# Patient Record
Sex: FEMALE | Race: WHITE | NOT HISPANIC OR LATINO | Employment: UNEMPLOYED | URBAN - METROPOLITAN AREA
[De-identification: names, ages, dates, MRNs, and addresses within clinical notes are randomized per-mention and may not be internally consistent; named-entity substitution may affect disease eponyms.]

---

## 2018-07-31 NOTE — PLAN OF CARE
Please call extension [phone] (if nobody answers after pressing #1 for admission, this will flip to a beeper after pressing #1 for admission, so put in your call back number) upon patient arrival to floor for Hospital Medicine admit team assignment and for additional admit orders for the patient.       Outside Transfer Acceptance Note     Patients name: WARREN MCINTYRE MRN: [id number]    Transferring Physician or Mid-Level provider/Clinic giving report:   DR. LEBLANC AT [facility] IN ICU (STEPPING DOWN)    Accepting Physician for admission to hospital: Gabriele Duckworth M.D.      Date of acceptance:  7/31/18, LEVEL II, TELE, NEURO STEPDOWN     Reason for transfer:  Catatonia, needs VEEG    Report from Physician/Mid-Level Provider:    HPI:  Ms. Mcintyre is a 29yo lady with a past medical history of depression. She experienced an episode of catatonia in 2015 after the death of her mother, which led to a stint in an inpatient psychiatric facility.       She was just admitted to the ICU at [facility] on the night of 7/29/18.  She remains in the ICU currently, but is being stepped down.  She was found altered and brought to the ED (nobody can provide collateral details of this, and she has had no family visit or return calls).  Since admit she will only blink and stare ahead.  At admission she was dehydrated with an elevated lactic acid and Na level.  This morning she became slightly more lucid and was alert x3, only to return to her previous catatonic state.     At admission her WBC was normal, then merry to 18k, then returned to normal.  She had blood cxs done, which turned positive 1 of 2 with GPC (too early to know if chains or clusters), for which she was started on Vancomyin and Rocephin.  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  The sending MD was unable to perform a LP due to her being given Lovenox for DVT prophylaxis.      They feel that she needs  24 hour VEEG, Psychiatry and Neurology evaluation, none of which is available there.  I discussed the case with [facility] staff, Dr. Smith, who feels the patient is stable for the floor rather than NCC for the time being.  VS:  99.8F, P85, RR 14, /77  LABS:  admit: LA 3.8, Na 150, HCO3 12  Currently: LA and BMP normal except mild hypokalemia  UDS - NEGATIVE, UPT - NEGATIVE, TSH -PENDING  U/A - NEGATIVE    RADIOGRAPHS:   CT HEAD: NML  MRI BRAIN: NML  CXR: NML        To Do List upon arrival:  PLACE ON NEURO STEPDOWN FOR 24 HOUR VEEG, NEUROLOGY AND PSYCHIATRY CONSULT, RPR, HIV, TSH, NH3, NEURO CHECKS Q4 HOURS.    Please call extension [phone] (if nobody answers after pressing #1 for admission, this will flip to a beeper after pressing #1 for admission, so put in your call back number) upon patient arrival to floor for Hospital Medicine admit team assignment and for additional admit orders for the patient.

## 2018-08-01 ENCOUNTER — HOSPITAL ENCOUNTER (INPATIENT)
Facility: HOSPITAL | Age: 28
LOS: 7 days | Discharge: ANOTHER HOSPITAL | DRG: 885 | End: 2018-08-08
Attending: INTERNAL MEDICINE | Admitting: HOSPITALIST
Payer: MEDICAID

## 2018-08-01 DIAGNOSIS — F06.1 CATATONIA: Primary | ICD-10-CM

## 2018-08-01 DIAGNOSIS — R41.82 ALTERED MENTAL STATE: ICD-10-CM

## 2018-08-01 DIAGNOSIS — R41.82 ALTERED MENTAL STATUS, UNSPECIFIED ALTERED MENTAL STATUS TYPE: ICD-10-CM

## 2018-08-01 DIAGNOSIS — F32.2 MAJOR DEPRESSIVE DISORDER, SEVERE: ICD-10-CM

## 2018-08-01 PROBLEM — D68.9 COAGULOPATHY: Status: ACTIVE | Noted: 2018-08-01

## 2018-08-01 PROBLEM — R78.81 BACTEREMIA: Status: ACTIVE | Noted: 2018-08-01

## 2018-08-01 LAB
ALBUMIN SERPL BCP-MCNC: 3.2 G/DL
ALP SERPL-CCNC: 52 U/L
ALT SERPL W/O P-5'-P-CCNC: 10 U/L
AMMONIA PLAS-SCNC: 48 UMOL/L
ANION GAP SERPL CALC-SCNC: 9 MMOL/L
AST SERPL-CCNC: 24 U/L
BASOPHILS # BLD AUTO: 0.03 K/UL
BASOPHILS NFR BLD: 0.2 %
BILIRUB SERPL-MCNC: 0.4 MG/DL
BUN SERPL-MCNC: 5 MG/DL
CALCIUM SERPL-MCNC: 8.9 MG/DL
CHLORIDE SERPL-SCNC: 106 MMOL/L
CK SERPL-CCNC: 183 U/L
CO2 SERPL-SCNC: 25 MMOL/L
CREAT SERPL-MCNC: 0.6 MG/DL
CRP SERPL-MCNC: 3.8 MG/L
DIFFERENTIAL METHOD: ABNORMAL
EOSINOPHIL # BLD AUTO: 0 K/UL
EOSINOPHIL NFR BLD: 0.2 %
ERYTHROCYTE [DISTWIDTH] IN BLOOD BY AUTOMATED COUNT: 13.9 %
ERYTHROCYTE [SEDIMENTATION RATE] IN BLOOD BY WESTERGREN METHOD: 10 MM/HR
EST. GFR  (AFRICAN AMERICAN): >60 ML/MIN/1.73 M^2
EST. GFR  (NON AFRICAN AMERICAN): >60 ML/MIN/1.73 M^2
GLUCOSE SERPL-MCNC: 110 MG/DL
HCT VFR BLD AUTO: 39.5 %
HGB BLD-MCNC: 13.2 G/DL
HIV1+2 IGG SERPL QL IA.RAPID: NEGATIVE
IMM GRANULOCYTES # BLD AUTO: 0.27 K/UL
IMM GRANULOCYTES NFR BLD AUTO: 2.2 %
INR PPP: 1.4
LACTATE SERPL-SCNC: 1.2 MMOL/L
LYMPHOCYTES # BLD AUTO: 3.1 K/UL
LYMPHOCYTES NFR BLD: 25.2 %
MAGNESIUM SERPL-MCNC: 2.3 MG/DL
MCH RBC QN AUTO: 31.7 PG
MCHC RBC AUTO-ENTMCNC: 33.4 G/DL
MCV RBC AUTO: 95 FL
MONOCYTES # BLD AUTO: 1.1 K/UL
MONOCYTES NFR BLD: 8.8 %
NEUTROPHILS # BLD AUTO: 7.7 K/UL
NEUTROPHILS NFR BLD: 63.4 %
NRBC BLD-RTO: 0 /100 WBC
PHOSPHATE SERPL-MCNC: 3.3 MG/DL
PLATELET # BLD AUTO: 200 K/UL
PMV BLD AUTO: 11.8 FL
POCT GLUCOSE: 103 MG/DL (ref 70–110)
POTASSIUM SERPL-SCNC: 4.5 MMOL/L
PROT SERPL-MCNC: 6.5 G/DL
PROTHROMBIN TIME: 14 SEC
RBC # BLD AUTO: 4.17 M/UL
SODIUM SERPL-SCNC: 140 MMOL/L
TSH SERPL DL<=0.005 MIU/L-ACNC: 2.56 UIU/ML
TSH SERPL DL<=0.005 MIU/L-ACNC: 2.56 UIU/ML
WBC # BLD AUTO: 12.11 K/UL

## 2018-08-01 PROCEDURE — 93005 ELECTROCARDIOGRAM TRACING: CPT

## 2018-08-01 PROCEDURE — 82140 ASSAY OF AMMONIA: CPT

## 2018-08-01 PROCEDURE — 86140 C-REACTIVE PROTEIN: CPT

## 2018-08-01 PROCEDURE — 85025 COMPLETE CBC W/AUTO DIFF WBC: CPT

## 2018-08-01 PROCEDURE — 99223 1ST HOSP IP/OBS HIGH 75: CPT | Mod: ,,, | Performed by: HOSPITALIST

## 2018-08-01 PROCEDURE — 82550 ASSAY OF CK (CPK): CPT

## 2018-08-01 PROCEDURE — 86703 HIV-1/HIV-2 1 RESULT ANTBDY: CPT

## 2018-08-01 PROCEDURE — 86592 SYPHILIS TEST NON-TREP QUAL: CPT

## 2018-08-01 PROCEDURE — 84100 ASSAY OF PHOSPHORUS: CPT

## 2018-08-01 PROCEDURE — 85651 RBC SED RATE NONAUTOMATED: CPT

## 2018-08-01 PROCEDURE — 87040 BLOOD CULTURE FOR BACTERIA: CPT | Mod: 59

## 2018-08-01 PROCEDURE — 83735 ASSAY OF MAGNESIUM: CPT

## 2018-08-01 PROCEDURE — 84443 ASSAY THYROID STIM HORMONE: CPT

## 2018-08-01 PROCEDURE — 80053 COMPREHEN METABOLIC PANEL: CPT

## 2018-08-01 PROCEDURE — 93010 ELECTROCARDIOGRAM REPORT: CPT | Mod: ,,, | Performed by: INTERNAL MEDICINE

## 2018-08-01 PROCEDURE — 83605 ASSAY OF LACTIC ACID: CPT

## 2018-08-01 PROCEDURE — 20600001 HC STEP DOWN PRIVATE ROOM

## 2018-08-01 PROCEDURE — 85610 PROTHROMBIN TIME: CPT

## 2018-08-01 PROCEDURE — 82607 VITAMIN B-12: CPT

## 2018-08-01 PROCEDURE — 25000003 PHARM REV CODE 250: Performed by: HOSPITALIST

## 2018-08-01 PROCEDURE — 63600175 PHARM REV CODE 636 W HCPCS: Performed by: HOSPITALIST

## 2018-08-01 RX ORDER — ENOXAPARIN SODIUM 100 MG/ML
40 INJECTION SUBCUTANEOUS EVERY 24 HOURS
Status: DISCONTINUED | OUTPATIENT
Start: 2018-08-01 | End: 2018-08-08 | Stop reason: HOSPADM

## 2018-08-01 RX ORDER — PHYTONADIONE 10 MG/ML
10 INJECTION, EMULSION INTRAMUSCULAR; INTRAVENOUS; SUBCUTANEOUS ONCE
Status: COMPLETED | OUTPATIENT
Start: 2018-08-02 | End: 2018-08-02

## 2018-08-01 RX ORDER — LORAZEPAM 2 MG/ML
1 INJECTION INTRAMUSCULAR 3 TIMES DAILY
Status: DISCONTINUED | OUTPATIENT
Start: 2018-08-01 | End: 2018-08-02

## 2018-08-01 RX ORDER — SODIUM CHLORIDE 0.9 % (FLUSH) 0.9 %
5 SYRINGE (ML) INJECTION
Status: DISCONTINUED | OUTPATIENT
Start: 2018-08-01 | End: 2018-08-08 | Stop reason: HOSPADM

## 2018-08-01 RX ORDER — LINEZOLID 2 MG/ML
600 INJECTION, SOLUTION INTRAVENOUS
Status: DISCONTINUED | OUTPATIENT
Start: 2018-08-01 | End: 2018-08-05

## 2018-08-01 RX ORDER — ALPRAZOLAM 1 MG/1
1 TABLET ORAL 3 TIMES DAILY
Status: ON HOLD | COMMUNITY
End: 2018-08-08 | Stop reason: HOSPADM

## 2018-08-01 RX ORDER — ZOLPIDEM TARTRATE 10 MG/1
5 TABLET ORAL NIGHTLY
Status: ON HOLD | COMMUNITY
End: 2018-08-08 | Stop reason: HOSPADM

## 2018-08-01 RX ORDER — DEXTROSE MONOHYDRATE, SODIUM CHLORIDE, AND POTASSIUM CHLORIDE 50; 1.49; 4.5 G/1000ML; G/1000ML; G/1000ML
INJECTION, SOLUTION INTRAVENOUS CONTINUOUS
Status: DISCONTINUED | OUTPATIENT
Start: 2018-08-01 | End: 2018-08-08 | Stop reason: HOSPADM

## 2018-08-01 RX ORDER — LIDOCAINE 50 MG/G
1 PATCH TOPICAL
Status: DISCONTINUED | OUTPATIENT
Start: 2018-08-01 | End: 2018-08-08 | Stop reason: HOSPADM

## 2018-08-01 RX ADMIN — LINEZOLID 600 MG: 600 INJECTION, SOLUTION INTRAVENOUS at 10:08

## 2018-08-01 RX ADMIN — LORAZEPAM 1 MG: 2 INJECTION INTRAMUSCULAR; INTRAVENOUS at 10:08

## 2018-08-01 RX ADMIN — DEXTROSE MONOHYDRATE, SODIUM CHLORIDE, AND POTASSIUM CHLORIDE: 50; 4.5; 1.49 INJECTION, SOLUTION INTRAVENOUS at 10:08

## 2018-08-02 PROBLEM — F32.2 MAJOR DEPRESSIVE DISORDER, SEVERE: Status: ACTIVE | Noted: 2018-08-02

## 2018-08-02 LAB
ANION GAP SERPL CALC-SCNC: 9 MMOL/L
BASOPHILS # BLD AUTO: 0.03 K/UL
BASOPHILS NFR BLD: 0.3 %
BUN SERPL-MCNC: 6 MG/DL
CALCIUM SERPL-MCNC: 8.8 MG/DL
CHLORIDE SERPL-SCNC: 106 MMOL/L
CO2 SERPL-SCNC: 25 MMOL/L
CREAT SERPL-MCNC: 0.6 MG/DL
DIFFERENTIAL METHOD: ABNORMAL
EOSINOPHIL # BLD AUTO: 0.1 K/UL
EOSINOPHIL NFR BLD: 0.6 %
ERYTHROCYTE [DISTWIDTH] IN BLOOD BY AUTOMATED COUNT: 13.8 %
EST. GFR  (AFRICAN AMERICAN): >60 ML/MIN/1.73 M^2
EST. GFR  (NON AFRICAN AMERICAN): >60 ML/MIN/1.73 M^2
GLUCOSE SERPL-MCNC: 119 MG/DL
HCT VFR BLD AUTO: 37.7 %
HGB BLD-MCNC: 12.7 G/DL
IMM GRANULOCYTES # BLD AUTO: 0.07 K/UL
IMM GRANULOCYTES NFR BLD AUTO: 0.7 %
LYMPHOCYTES # BLD AUTO: 2 K/UL
LYMPHOCYTES NFR BLD: 18.4 %
MCH RBC QN AUTO: 31.7 PG
MCHC RBC AUTO-ENTMCNC: 33.7 G/DL
MCV RBC AUTO: 94 FL
MONOCYTES # BLD AUTO: 0.9 K/UL
MONOCYTES NFR BLD: 8.8 %
NEUTROPHILS # BLD AUTO: 7.6 K/UL
NEUTROPHILS NFR BLD: 71.2 %
NRBC BLD-RTO: 0 /100 WBC
PLATELET # BLD AUTO: 188 K/UL
PMV BLD AUTO: 12 FL
POCT GLUCOSE: 123 MG/DL (ref 70–110)
POTASSIUM SERPL-SCNC: 3.5 MMOL/L
RBC # BLD AUTO: 4.01 M/UL
RPR SER QL: NORMAL
SODIUM SERPL-SCNC: 140 MMOL/L
VIT B12 SERPL-MCNC: 1047 PG/ML
WBC # BLD AUTO: 10.6 K/UL

## 2018-08-02 PROCEDURE — 25000003 PHARM REV CODE 250: Performed by: HOSPITALIST

## 2018-08-02 PROCEDURE — 99233 SBSQ HOSP IP/OBS HIGH 50: CPT | Mod: ,,, | Performed by: INTERNAL MEDICINE

## 2018-08-02 PROCEDURE — 99232 SBSQ HOSP IP/OBS MODERATE 35: CPT | Mod: ,,, | Performed by: PSYCHIATRY & NEUROLOGY

## 2018-08-02 PROCEDURE — 63600175 PHARM REV CODE 636 W HCPCS: Performed by: PSYCHIATRY & NEUROLOGY

## 2018-08-02 PROCEDURE — 36415 COLL VENOUS BLD VENIPUNCTURE: CPT

## 2018-08-02 PROCEDURE — 63600175 PHARM REV CODE 636 W HCPCS: Performed by: HOSPITALIST

## 2018-08-02 PROCEDURE — 80048 BASIC METABOLIC PNL TOTAL CA: CPT

## 2018-08-02 PROCEDURE — 85025 COMPLETE CBC W/AUTO DIFF WBC: CPT

## 2018-08-02 PROCEDURE — 90792 PSYCH DIAG EVAL W/MED SRVCS: CPT | Mod: ,,, | Performed by: PSYCHIATRY & NEUROLOGY

## 2018-08-02 PROCEDURE — 20600001 HC STEP DOWN PRIVATE ROOM

## 2018-08-02 RX ORDER — LORAZEPAM 2 MG/ML
1 INJECTION INTRAMUSCULAR 4 TIMES DAILY
Status: DISCONTINUED | OUTPATIENT
Start: 2018-08-02 | End: 2018-08-08 | Stop reason: HOSPADM

## 2018-08-02 RX ADMIN — LORAZEPAM 1 MG: 2 INJECTION INTRAMUSCULAR; INTRAVENOUS at 03:08

## 2018-08-02 RX ADMIN — LORAZEPAM 1 MG: 2 INJECTION INTRAMUSCULAR; INTRAVENOUS at 08:08

## 2018-08-02 RX ADMIN — ENOXAPARIN SODIUM 40 MG: 100 INJECTION SUBCUTANEOUS at 05:08

## 2018-08-02 RX ADMIN — DEXTROSE MONOHYDRATE, SODIUM CHLORIDE, AND POTASSIUM CHLORIDE: 50; 4.5; 1.49 INJECTION, SOLUTION INTRAVENOUS at 12:08

## 2018-08-02 RX ADMIN — LORAZEPAM 1 MG: 2 INJECTION INTRAMUSCULAR; INTRAVENOUS at 09:08

## 2018-08-02 RX ADMIN — LIDOCAINE 1 PATCH: 50 PATCH TOPICAL at 12:08

## 2018-08-02 RX ADMIN — DEXTROSE MONOHYDRATE, SODIUM CHLORIDE, AND POTASSIUM CHLORIDE: 50; 4.5; 1.49 INJECTION, SOLUTION INTRAVENOUS at 06:08

## 2018-08-02 RX ADMIN — PHYTONADIONE 10 MG: 10 INJECTION, EMULSION INTRAMUSCULAR; INTRAVENOUS; SUBCUTANEOUS at 12:08

## 2018-08-02 RX ADMIN — ENOXAPARIN SODIUM 40 MG: 100 INJECTION SUBCUTANEOUS at 12:08

## 2018-08-02 RX ADMIN — LINEZOLID 600 MG: 600 INJECTION, SOLUTION INTRAVENOUS at 01:08

## 2018-08-02 NOTE — PROGRESS NOTES
Hospital Medicine  Progress note    I personally performed the history, physical exam and medical decision making: and confirmed the accuracy of the information in the transcribed note.    Team: [facility] HOSP MED B Mohsen Astorga MD  Admit Date: 8/1/2018  KRISHNA 8/6/2018  Code status: Full Code    Principal Problem:  Catatonia    Interval hx: Psych recommends increase ativan IV to 2mg PO q6hrs, if no improvement would obtain consent from father for ECT. Would consider addition of antidepressant after Zyvox treatment has been completed and outpatient coordination with Dr Cormier.      ROS   Unable to perform ROS: Mental status change       PEx  Temp:  [97.3 °F (36.3 °C)-99.8 °F (37.7 °C)]   Pulse:  [82-97]   Resp:  [16-20]   BP: (113-131)/(61-81)   SpO2:  [96 %-98 %]   No intake or output data in the 24 hours ending 08/02/18 1342     Constitutional: She appears well-developed and well-nourished. No distress. obese   Cardiovascular: Normal rate, regular rhythm, normal heart sounds and intact distal pulses.  Exam reveals no gallop and no friction rub.    No murmur heard.  Pulmonary/Chest: Effort normal and breath sounds normal. No respiratory distress. She has no wheezes. She has no rales. Room air   Abdominal: Soft. Bowel sounds are normal. She exhibits no distension and no mass. There is no tenderness.  Neurological: She is alert. She has normal reflexes. She displays no atrophy and no tremor. She exhibits normal muscle tone. She displays no seizure activity.   Does not respond to commands. Blinked to confrontation once, but then did not. Slightly molded her hand around mine on the R. No response with the L hand. No response to Babinski   Skin: Skin is warm and dry. No rash noted. She is not diaphoretic. No erythema. No pallor.   Nursing note and vitals reviewed.      Recent Labs  Lab 08/01/18 2219 08/02/18  0535   WBC 12.11 10.60   HGB 13.2 12.7   HCT 39.5 37.7    188       Recent Labs  Lab 08/01/18 2219  08/02/18  0535    140   K 4.5 3.5    106   CO2 25 25   BUN 5* 6   CREATININE 0.6 0.6    119*   CALCIUM 8.9 8.8   MG 2.3  --    PHOS 3.3  --        Recent Labs  Lab 08/01/18 2219 08/01/18  2220   ALKPHOS 52*  --    ALT 10  --    AST 24  --    ALBUMIN 3.2*  --    PROT 6.5  --    BILITOT 0.4  --    INR  --  1.4*        Recent Labs  Lab 08/01/18 2219 08/02/18  0736   POCTGLUCOSE 103 123*     Recent Labs      08/01/18 2219   CPK  183*       Scheduled Meds:   enoxaparin  40 mg Subcutaneous Daily    lidocaine  1 patch Transdermal Q24H    linezolid 600mg/300ml  600 mg Intravenous Q12H    lorazepam  1 mg Intravenous TID     Continuous Infusions:   dextrose 5 % and 0.45 % NaCl with KCl 20 mEq 125 mL/hr at 08/02/18 1253     As Needed:  sodium chloride 0.9%    Active Hospital Problems    Diagnosis  POA    *Catatonia [F06.1]  Yes    Major depressive disorder, severe [F32.2]  Yes    Bacteremia [R78.81]  Yes    Coagulopathy [D68.9]  Yes      Resolved Hospital Problems    Diagnosis Date Resolved POA   No resolved problems to display.       Overview      Assessment and Plan for Problems addressed today:    Catatonia     - OSH workup including LP, MRI/MRA, CTH negative  - check HIV, RPR, TSH  - vEEG ordered  - not malignant- no fever, no rigidity, no autonomic instability  - start ativan 1mg TID. Patient woke up after receiving first dose. May need to increase or patient may need ECT again  - Psych and Neuro consults  - Nutrition consult for tube feeding/PPN/TPN recs since patient has not been eating for almost 1 week     Coagulopathy     INR 1.4  May be nutritional vitamin K deficiency given long NPO status  Vitamin K SQ x1             Bacteremia     - 1/2 cultures positive on admit to OSH for gram positive. Notes from OSH state thought to be E. Faecalis. TTE performed and negative there. Repeat blood cultures negative. ?contaminant?  - repeat blood cultures x2  - continue linezolid for now- will need  to call OSH for culture results to narrow               Discharge plan and follow up    Provider    I personally scribed for Mohsen Astorga MD on 08/02/2018 at 12:38 PM. Electronically signed by christian Anderson III on 08/02/2018 at 12:38 PM

## 2018-08-02 NOTE — HPI
"Ms Rupal Mcintyre is a 28 y.o. woman with depression and prior catatonia who presents as transfer from [facility] for catatonia nad Neuro/Psych evaluation.     Review of [facility] Course:  - admit 7/30 with AMS and catatonia. Notes say father provided intake data  - /66,   - Labs on admit 7/29/18: Na 150, K 4-> 2.8 (7/30) Cl 118, Cr 1, Lactic 2.7--> 4.1 (7/30)--> 0.8 (7/30). WBC 7-> 18.5 (7/30)-> 8.2 (7/31), Hgb 8.2, Plt 151. UA Spec grav >1.030. UDS negative, APAP and ASA negative. CXR unremarkable  - CTH "unremarkable" per notes  - MRI/MRA brain without contrast 7/31: no acute intracranial abnormality, benign cerebellar tonsillar ectopia  - started on vanc and ceftriaxone for possible meningitis. LP performed- 0 WBC, 11 RBC, clear, negative xanthrochromia. CSF encephalitis panel and HSV PCR pending. 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 Vanc changed to linezolid  - started on IVF and clinimix for dehydration  - on 7/31, notes state patient is awake and oriented, talks about being unhappy that mom and grandmother have passed away and because of moving away. Notes then state that patient became unresponsive again. Started on lexapro 10mg daily  - 7/29 blood cultures 1/2 postive for GPC (probable E faecalis per notes), repeats negative. TTE negative for vegetation per notes on 8/1  - per notes, no family has been at bedside and family has been unable to be contacted since admission  - transferred to [facility] 8[place]1 for Neuro and Psych consults    Patient is lying in bed with eyes open but does not respond to questions. Called father Kennedy Mcintyre- he states this happened 3 years ago and was hospitalized at [facility] for catatonia associated with depression. Required ECT which was successful. Became unresponsive again 6-7 days ago off and on. She had decreased PO intake. Father states that first episode was triggered by patient's mother's death. He thinks that paternal grandmother's death (3 years ago) " "and recent move into her grandmother's house due to snakes infesting her trailer (3 months ago). This move made patient depressed according to her father. He also thinks that break up with her boyfriend (3-4 years ago). may have triggered it. Patient says that she has been depressed for "a while" according to her father. She follows with Dr Arenas in [place] and receives meds from [facility] in [place] (not open currently).     After giving 1mg ativan x1, patient woke up. She was disoriented but then realized she was in [place]. She says she has had "bad depression" but could not elaborate further. She says she takes xanax PRN and no other psych meds. She complains of pain in the bad of her neck.   "

## 2018-08-02 NOTE — SUBJECTIVE & OBJECTIVE
Patient History           Medical as of 8/1/2018     Past Medical History     Diagnosis Date Comments Source    Catatonia 2015 -- Provider    Depression with prior catatonia -- Provider    Hypertension -- -- Provider    Miscarriage 2012 -- Provider                  Surgical as of 8/1/2018    Past Surgical History: Patient provided no pertinent surgical history.           Family as of 8/1/2018     Problem Relation Name Age of Onset Comments Source    Cancer Mother -- -- colon Provider    Cancer Father -- -- prostate Provider    Heart disease Father -- -- -- Provider    Arthritis Father -- -- -- Provider    Depression Father -- -- -- Provider    Bipolar disorder Brother -- -- -- Provider    Cancer Paternal Grandmother -- -- bone Provider            Tobacco Use as of 8/1/2018     Smoking Status Smoking Start Date Smoking Quit Date Packs/day Years Used    Current Some Day Smoker -- -- 0.25 --    Types Comments Smokeless Tobacco Status Smokeless Tobacco Quit Date Source    -- -- Unknown -- Provider            Alcohol Use as of 8/1/2018     Alcohol Use Drinks/Week Alcohol/Week Comments Source    Yes -- -- occasional Provider            Drug Use as of 8/1/2018     Drug Use Types Frequency Comments Source    No -- -- -- Provider            Sexual Activity as of 8/1/2018     Sexually Active Birth Control Partners Comments Source    -- -- -- -- Provider            Activities of Daily Living as of 8/1/2018    **None**           Social Documentation as of 8/1/2018    **None**           Occupational as of 8/1/2018    **None**           Socioeconomic as of 8/1/2018     Marital Status Spouse Name Number of Children Years Education Preferred Language Ethnicity Race Source    Single -- -- -- English /White White --         Pertinent History Q A Comments    as of 8/1/2018 Lives with      Place in Birth Order      Lives in      Number of Siblings      Raised by      Legal Involvement      Childhood Trauma      Criminal  "History of      Financial Status      Highest Level of Education      Does patient have access to a firearm?       Service      Primary Leisure Activity      Spirituality       Past Medical History:   Diagnosis Date    Catatonia 2015    Depression with prior catatonia    Hypertension     Miscarriage 2012     History reviewed. No pertinent surgical history.  Family History     Problem Relation (Age of Onset)    Arthritis Father    Bipolar disorder Brother    Cancer Mother, Father, Paternal Grandmother    Depression Father    Heart disease Father        Social History Main Topics    Smoking status: Current Some Day Smoker     Packs/day: 0.25    Smokeless tobacco: Not on file    Alcohol use Yes      Comment: occasional    Drug use: No    Sexual activity: Not on file     Review of patient's allergies indicates:   Allergen Reactions    Vancomycin analogues Rash     Red man syndrome       No current facility-administered medications on file prior to encounter.      No current outpatient prescriptions on file prior to encounter.     Psychotherapeutics     Start     Stop Route Frequency Ordered    08/01/18 2145  lorazepam injection 1 mg      -- IV 3 times daily 08/01/18 2144        Review of Systems   Unable to perform ROS: Mental status change     Strengths and Liabilities: unable to assess    Objective:     Vital Signs (Most Recent):  Temp: 99.8 °F (37.7 °C) (08/01/18 1938)  Pulse: 83 (08/01/18 1938)  Resp: 16 (08/01/18 1938)  BP: 122/74 (08/01/18 1938)  SpO2: 97 % (08/01/18 1938) Vital Signs (24h Range):  Temp:  [97.3 °F (36.3 °C)-99.8 °F (37.7 °C)] 99.8 °F (37.7 °C)  Pulse:  [78-83] 83  Resp:  [12-16] 16  SpO2:  [97 %-98 %] 97 %  BP: (114-122)/(70-87) 122/74     Height: 5' 4" (162.6 cm)  Weight: 68 kg (150 lb)  Body mass index is 25.75 kg/m².    No intake or output data in the 24 hours ending 08/01/18 2239    Physical Exam   Constitutional: She appears well-developed and well-nourished.   HENT:   Head: " "Normocephalic and atraumatic.   Eyes: Conjunctivae are normal. Pupils are equal, round, and reactive to light. No scleral icterus.   Cardiovascular: Normal rate, regular rhythm, normal heart sounds and intact distal pulses.    Pulmonary/Chest: Effort normal and breath sounds normal.   Abdominal: Soft. Bowel sounds are normal. She exhibits no distension.   Neurological: She is unresponsive.   Skin: Skin is warm and dry. No rash noted. No erythema. No pallor.   Psychiatric:   Mental Status Exam:  Appearance: unremarkable, age appropriate  Behavior/Cooperation: stuporous, blank stare  Speech: mute  Mood: unable to assess  Affect: unable to assess  Thought Process: unable to assess  Thought Content: unable to assess  Perception: unable to assess  Orientation: unable to assess  Memory: unable to assess  Attention Span/Concentration: unable to assess; failed CAM-ICU  Insight: unable to assess  Judgment: unable to assess     CAM-ICU:  1. Acute change and/or fluctuating course of mental status: Yes  2. Inattention (SAVEAHAART): Yes  · "Squeeze my hand, only when you hear, the letter 'A'."  3. Altered Level of Consciousness: Yes  4. Disorganized Thinking (Errors >1/6): unable to assess  · "Will a stone float on water?"  · "Are there fish in the sea?"  · "Does one pound weigh more than two?"  · "Can you use a hammer to pound a nail?"  · Command(s):  · "Hold up 2 fingers."  · "Now do the same thing with the other hand."    Score: 1+2 AND, either 3 or 4 present = Positive CAM-ICU    NEUROLOGICAL EXAMINATION:     MENTAL STATUS   Level of consciousness: unresponsive to painful stimuli    CRANIAL NERVES     CN III, IV, VI   Pupils are equal, round, and reactive to light.       Unable to assess due to current patient condition     Significant Labs: All pertinent labs within the past 24 hours have been reviewed.   Recent Results (from the past 48 hour(s))   POCT glucose    Collection Time: 08/01/18 10:19 PM   Result Value Ref Range "    POCT Glucose 103 70 - 110 mg/dL      No results found for: PHENYTOIN, PHENOBARB, VALPROATE, CBMZ    Significant Imaging: None

## 2018-08-02 NOTE — HPI
"Rupal Mcintyre is a 28 y.o. female with a history of depression and ADHD who presented to [facility] due to AMS. Psychiatry was consulted to address the patient's symptom of catatonia.    HPI per Primary Team:   Patient experienced an episode of catatonia in 2015 after the death of her mother, which led to a stint in an inpatient psychiatric facility. She was found altered and brought to the ED (nobody can provide collateral details of this, and she has had no family visit or return calls).  Since admit she will only blink and stare ahead.  At admission she was dehydrated with an elevated lactic acid and Na level.  This morning she became slightly more lucid and was alert x3, only to return to her previous catatonic state. At admission her WBC was normal, then merry to 18k, then returned to normal.  She had blood cxs done, which turned positive 1 of 2 with GPC (too early to know if chains or clusters), for which she was started on Vancomyin and Rocephin.  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  The sending MD was unable to perform a LP due to her being given Lovenox for DVT prophylaxis.    Patient is lying in bed with eyes open but does not respond to questions. Called father Kennedy Mcintyre- he states this happened 3 years ago and was hospitalized at [facility] for catatonia associated with depression. Required ECT which was successful. Became unresponsive again 6-7 days ago off and on. She had decreased PO intake. Father states that first episode was triggered by patient's mother's death. He thinks that paternal grandmother's death (3 years ago) and recent move into her grandmother's house due to snakes infesting her trailer (3 months ago). This move made patient depressed according to her father. He also thinks that break up with her boyfriend (3-4 years ago). may have triggered it. Patient says that she has been depressed for "a while" according to her father. She " "follows with Dr Arenas in [place] and receives meds from [facility] in [place] (not open currently).      After giving 1mg ativan x1, patient woke up. She was disoriented but then realized she was in [place]. She says she has had "bad depression" but could not elaborate further. She says she takes xanax PRN and no other psych meds. She complains of pain in the bad of her neck.     Psychiatry Consult:  HPI limited due to patient's current condition. Patient demonstrates mutism and stupor with no reactivity to painful stimuli. Patient has a blank stare and only blinks. Upon lifting her arm into the arm, her arm immediately drops down. Per patient's sitter, the patient has remained in the same position for the entire duration that the sitter has been with her (approximately 3 hours). Sitter states that when the patient has been moved to a different position, she will return back to her initial position. The sitter reports that the patient has grunted on a few occasions, but has not spoken at all. Unable to assess for mood, SI, HI, and AVH.    Per the Luther-Alvino Catatonia Rating Scale, the patient is positive for immobility/stupor (3 pts), mutism (3pts), staring (3 pts), and negativism (3pts), which is indicative of catatonia. The patient also failed CAM-ICU (demonstrated AMS, failed SAVEAHAART test for inattention, and demonstrated altered level of consciousness), which is indicative of delirium.    Per [facility] Chart Review:  [facility] evaluated the patient for catatonia on 1/8/2016. The patient was initially started on Ativan 1mg IV TID and Amantadine 100mg NG TID which was ineffective, so consent was obtained from the patient's father for ECT. The patient returned to baseline after two ECT sessions and was started on Celexa 20mg daily for MDD.    Collateral: Kennedy Mcintyre (father) [phone]    Psychiatric Review of Systems-is patient experiencing or having changes in  sleep: unable to " assess  appetite: unable to assess  weight: unable to assess  energy/anergy: unable to assess  interest/pleasure/anhedonia: unable to assess  somatic symptoms: unable to assess  libido: unable to assess  anxiety/panic: unable to assess  guilty/hopelessness: unable to assess  concentration: unable to assess  S.I.B.s/risky behavior: unable to assess  any drugs: unable to assess  alcohol: unable to assess     History obtained from chart review and [facility] records due to patient's current condition:     Past Psychiatric History:  Previous Medication Trials: Cymbalta, Celexa, Adderall, Xanax, Ambien   Previous Psychiatric Hospitalizations: no   Previous Suicide Attempts: no   History of Violence: no  Outpatient Psychiatrist: Dr. Cormier in [place] ([phone])    Social History:  Marital Status: not   Children: 0   Employment Status/Info: currently unemployed  Education: 10th grade  Special Ed: no  Housing Status: grandmother's house  History of phys/sexual abuse: no  Access to gun: no    Substance Abuse History:  Recreational Drugs: none  Use of Alcohol: denied  Rehab History:no   Tobacco Use:no  Use of OTC: unknown    Legal History:  Past Charges/Incarcerations:yes, she and boyfriend were arrested for marijuana possession around ~2011   Pending charges:no     Family Psychiatric History:   Brother with depression and ADHD    Psychosocial Stressors: recent move from [facility] to grandmother's house  Functioning Relationships: good relationship with father

## 2018-08-02 NOTE — CONSULTS
[facility]  Psychiatry  Consult Note    Patient Name: Rupal Mcintyre  MRN: [id number]   Code Status: Full Code  Admission Date: 8/1/2018  Hospital Length of Stay: 1 days  Attending Physician: Mohsen Astorga MD  Primary Care Provider: Primary Doctor No    Current Legal Status: N/A    Patient information was obtained from patient, past medical records and ER records.   Inpatient consult to Psychiatry  Consult performed by: GERTRUDE JAIMES  Consult ordered by: ELKE CABA        Subjective:     Principal Problem:<principal problem not specified>    Chief Complaint:  Unable to obtain.     HPI: Rupal Mcintyre is a 28 y.o. female with a history of depression and ADHD who presented to [facility] due to AMS. Psychiatry was consulted to address the patient's symptom of catatonia.    HPI per Primary Team:   Patient experienced an episode of catatonia in 2015 after the death of her mother, which led to a stint in an inpatient psychiatric facility. She was found altered and brought to the ED (nobody can provide collateral details of this, and she has had no family visit or return calls).  Since admit she will only blink and stare ahead.  At admission she was dehydrated with an elevated lactic acid and Na level.  This morning she became slightly more lucid and was alert x3, only to return to her previous catatonic state. At admission her WBC was normal, then merry to 18k, then returned to normal.  She had blood cxs done, which turned positive 1 of 2 with GPC (too early to know if chains or clusters), for which she was started on Vancomyin and Rocephin.  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  The sending MD was unable to perform a LP due to her being given Lovenox for DVT prophylaxis.    Patient is lying in bed with eyes open but does not respond to questions. Called father Kennedy Mcintyre- he states this happened 3 years ago and was hospitalized at  "[facility] for catatonia associated with depression. Required ECT which was successful. Became unresponsive again 6-7 days ago off and on. She had decreased PO intake. Father states that first episode was triggered by patient's mother's death. He thinks that paternal grandmother's death (3 years ago) and recent move into her grandmother's house due to snakes infesting her trailer (3 months ago). This move made patient depressed according to her father. He also thinks that break up with her boyfriend (3-4 years ago). may have triggered it. Patient says that she has been depressed for "a while" according to her father. She follows with Dr Arenas in [place] and receives meds from [facility] in [place] (not open currently).      After giving 1mg ativan x1, patient woke up. She was disoriented but then realized she was in [place]. She says she has had "bad depression" but could not elaborate further. She says she takes xanax PRN and no other psych meds. She complains of pain in the bad of her neck.     Psychiatry Consult:  HPI limited due to patient's current condition. Patient demonstrates mutism and stupor with no reactivity to painful stimuli. Patient has a blank stare and only blinks. Upon lifting her arm into the arm, her arm immediately drops down. Per patient's sitter, the patient has remained in the same position for the entire duration that the sitter has been with her (approximately 3 hours). Sitter states that when the patient has been moved to a different position, she will return back to her initial position. The sitter reports that the patient has grunted on a few occasions, but has not spoken at all. Unable to assess for mood, SI, HI, and AVH.    Per the Luther-Alvino Catatonia Rating Scale, the patient is positive for immobility/stupor (3 pts), mutism (3pts), staring (3 pts), and negativism (3pts), which is indicative of catatonia. The patient also failed CAM-ICU (demonstrated AMS, failed " SAVEAHAART test for inattention, and demonstrated altered level of consciousness), which is indicative of delirium.    Per [facility] Chart Review:  [facility] evaluated the patient for catatonia on 1/8/2016. The patient was initially started on Ativan 1mg IV TID and Amantadine 100mg NG TID which was ineffective, so consent was obtained from the patient's father for ECT. The patient returned to baseline after two ECT sessions and was started on Celexa 20mg daily for MDD.    Collateral: Kennedy Mcintyre (father) [phone]    Psychiatric Review of Systems-is patient experiencing or having changes in  sleep: unable to assess  appetite: unable to assess  weight: unable to assess  energy/anergy: unable to assess  interest/pleasure/anhedonia: unable to assess  somatic symptoms: unable to assess  libido: unable to assess  anxiety/panic: unable to assess  guilty/hopelessness: unable to assess  concentration: unable to assess  S.I.B.s/risky behavior: unable to assess  any drugs: unable to assess  alcohol: unable to assess     History obtained from chart review and [facility] records due to patient's current condition:     Past Psychiatric History:  Previous Medication Trials: Cymbalta, Celexa, Adderall, Xanax, Ambien   Previous Psychiatric Hospitalizations: no   Previous Suicide Attempts: no   History of Violence: no  Outpatient Psychiatrist: Dr. Cormier in [place] ([phone])    Social History:  Marital Status: not   Children: 0   Employment Status/Info: currently unemployed  Education: 10th grade  Special Ed: no  Housing Status: grandmother's house  History of phys/sexual abuse: no  Access to gun: no    Substance Abuse History:  Recreational Drugs: none  Use of Alcohol: denied  Rehab History:no   Tobacco Use:no  Use of OTC: unknown    Legal History:  Past Charges/Incarcerations:yes, she and boyfriend were arrested for marijuana possession around ~2011   Pending charges:no     Family Psychiatric History:   Brother  with depression and ADHD    Psychosocial Stressors: recent move from trailer to grandmother's house  Functioning Relationships: good relationship with father    Hospital Course: No notes on file         Patient History           Medical as of 8/2/2018     Past Medical History     Diagnosis Date Comments Source    Catatonia 2015 -- Provider    Depression with prior catatonia -- Provider    Hypertension -- -- Provider    Miscarriage 2012 -- Provider                  Surgical as of 8/2/2018    Past Surgical History: Patient provided no pertinent surgical history.           Family as of 8/2/2018     Problem Relation Name Age of Onset Comments Source    Cancer Mother -- -- colon Provider    Cancer Father -- -- prostate Provider    Heart disease Father -- -- -- Provider    Arthritis Father -- -- -- Provider    Depression Father -- -- -- Provider    Bipolar disorder Brother -- -- -- Provider    Cancer Paternal Grandmother -- -- bone Provider            Tobacco Use as of 8/2/2018     Smoking Status Smoking Start Date Smoking Quit Date Packs/day Years Used    Current Some Day Smoker -- -- 0.25 --    Types Comments Smokeless Tobacco Status Smokeless Tobacco Quit Date Source    -- -- Unknown -- Provider            Alcohol Use as of 8/2/2018     Alcohol Use Drinks/Week Alcohol/Week Comments Source    Yes -- -- occasional Provider            Drug Use as of 8/2/2018     Drug Use Types Frequency Comments Source    No -- -- -- Provider            Sexual Activity as of 8/2/2018     Sexually Active Birth Control Partners Comments Source    -- -- -- -- Provider            Activities of Daily Living as of 8/2/2018    **None**           Social Documentation as of 8/2/2018    **None**           Occupational as of 8/2/2018    **None**           Socioeconomic as of 8/2/2018     Marital Status Spouse Name Number of Children Years Education Preferred Language Ethnicity Race Source    Single -- -- -- English /White White --        "  Pertinent History Q A Comments    as of 8/2/2018 Lives with      Place in Birth Order      Lives in      Number of Siblings      Raised by      Legal Involvement      Childhood Trauma      Criminal History of      Financial Status      Highest Level of Education      Does patient have access to a firearm?       Service      Primary Leisure Activity      Spirituality       Past Medical History:   Diagnosis Date    Catatonia 2015    Depression with prior catatonia    Hypertension     Miscarriage 2012     History reviewed. No pertinent surgical history.  Family History     Problem Relation (Age of Onset)    Arthritis Father    Bipolar disorder Brother    Cancer Mother, Father, Paternal Grandmother    Depression Father    Heart disease Father        Social History Main Topics    Smoking status: Current Some Day Smoker     Packs/day: 0.25    Smokeless tobacco: Not on file    Alcohol use Yes      Comment: occasional    Drug use: No    Sexual activity: Not on file     Review of patient's allergies indicates:   Allergen Reactions    Vancomycin analogues Rash     Red man syndrome       No current facility-administered medications on file prior to encounter.      No current outpatient prescriptions on file prior to encounter.     Psychotherapeutics     Start     Stop Route Frequency Ordered    08/01/18 2145  lorazepam injection 1 mg      -- IV 3 times daily 08/01/18 2144        Review of Systems  Strengths and Liabilities: Strength: Patient is physically healthy.    Objective:     Vital Signs (Most Recent):  Temp: 98.9 °F (37.2 °C) (08/02/18 0734)  Pulse: 89 (08/02/18 0734)  Resp: 18 (08/02/18 0734)  BP: 120/77 (08/02/18 0734)  SpO2: 97 % (08/02/18 0734) Vital Signs (24h Range):  Temp:  [97.3 °F (36.3 °C)-99.8 °F (37.7 °C)] 98.9 °F (37.2 °C)  Pulse:  [78-97] 89  Resp:  [12-18] 18  SpO2:  [96 %-98 %] 97 %  BP: (113-128)/(61-87) 120/77     Height: 5' 4" (162.6 cm)  Weight: 68 kg (150 lb)  Body mass index is " 25.75 kg/m².    No intake or output data in the 24 hours ending 08/02/18 1108    Physical Exam   Mental Status Exam:  Appearance: unremarkable, age appropriate  Behavior/Cooperation: stuporous, blank stare  Speech: mute  Mood: unable to assess  Affect: unable to assess  Thought Process: unable to assess  Thought Content: unable to assess  Perception: unable to assess  Orientation: unable to assess  Memory: unable to assess  Attention Span/Concentration: unable to assess; failed CAM-ICU  Insight: unable to assess  Judgment: unable to assess     Significant Labs: All pertinent labs within the past 24 hours have been reviewed.    Significant Imaging: I have reviewed all pertinent imaging results/findings within the past 24 hours.    Assessment/Plan:     Major depressive disorder, severe    - Previous trials of Cymbalta and Celexa  - Consider addition of antidepressant after Zyvox treatment has been completed and coordinate with outpatient psychiatrist Dr. Cormier        Catatonia    - Per the Luther-Alvino Catatonia Rating Scale, the patient is positive for immobility/stupor (3 pts), mutism (3pts), staring (3 pts), and negativism (3pts), indicative of retarded catatonia  - Previous catatonic episode successfully treated with E[facility] at [facility] in January 2016  - Increase Ativan IV to 2 mg PO every 6 hours.  - If no improvement with Ativan, would try to obtain consent from patient's father for ECT  - Continue Lovenox DVT prophylaxis due to patient's current immobility/stupor  - Continue to monitor for dehydration and malnutrition by monitoring I/O, continuing IVF, and nutrition consult for tube feeds vs TPN/PPN             Total Time:  45 minutes     Ponce Anand MD   Psychiatry  [facility]

## 2018-08-02 NOTE — SUBJECTIVE & OBJECTIVE
Past Medical History:   Diagnosis Date    Catatonia 2015    Depression with prior catatonia    Hypertension     Miscarriage 2012       History reviewed. No pertinent surgical history.    Review of patient's allergies indicates:   Allergen Reactions    Vancomycin analogues Rash     Red man syndrome       Current Neurological Medications:   Lorazepam 1 mg IV TID    No current facility-administered medications on file prior to encounter.      No current outpatient prescriptions on file prior to encounter.     Family History     Problem Relation (Age of Onset)    Arthritis Father    Bipolar disorder Brother    Cancer Mother, Father, Paternal Grandmother    Depression Father    Heart disease Father        Social History Main Topics    Smoking status: Current Some Day Smoker     Packs/day: 0.25    Smokeless tobacco: Not on file    Alcohol use Yes      Comment: occasional    Drug use: No    Sexual activity: Not on file     Review of Systems   Unable to perform ROS: Patient nonverbal   Constitutional: Positive for appetite change.   Psychiatric/Behavioral: Positive for decreased concentration and dysphoric mood.     Objective:     Vital Signs (Most Recent):  Temp: 98.9 °F (37.2 °C) (08/02/18 0734)  Pulse: 89 (08/02/18 0734)  Resp: 18 (08/02/18 0734)  BP: 120/77 (08/02/18 0734)  SpO2: 97 % (08/02/18 0734) Vital Signs (24h Range):  Temp:  [97.3 °F (36.3 °C)-99.8 °F (37.7 °C)] 98.9 °F (37.2 °C)  Pulse:  [78-97] 89  Resp:  [12-18] 18  SpO2:  [96 %-98 %] 97 %  BP: (113-128)/(61-87) 120/77     Weight: 68 kg (150 lb)  Body mass index is 25.75 kg/m².    Physical Exam   Constitutional: She appears well-developed and well-nourished.   Unresponsive and non-cooperative during the exam    HENT:   Head: Normocephalic and atraumatic.   Eyes: EOM are normal. Pupils are equal, round, and reactive to light.   Patient w/ normal exra-ocular movements when medical student was examining her, did not follow commands while examined by me     Neck: No JVD present.   Cardiovascular: Normal rate and regular rhythm.    No murmur heard.  Pulmonary/Chest: Effort normal and breath sounds normal. No respiratory distress.   Abdominal: Soft. There is no tenderness.   Musculoskeletal: She exhibits edema. She exhibits no deformity.   Edematous L UE, likely 2/2 IV infiltration   Neurological: She is alert.   Eyes open, does not follow command.    Skin: Skin is warm and dry.   Psychiatric: She is withdrawn. She is noncommunicative.   Catatonic/not-resposive       NEUROLOGICAL EXAMINATION:     CRANIAL NERVES     CN III, IV, VI   Pupils are equal, round, and reactive to light.  Extraocular motions are normal.     Neurologic Exam:  Mental Status:  Alert, non-verbal.  Intermittently follows simple commands, depending on the examiner   Cranial Nerves: PERRLA, EOMI, although does not follow commands consistently.  No noted facial droop/assymetry .    Motor:  Normal bulk and tone.  Unable to test strength 2/2 patient's inability to follow commands.  Sensory:  Unable to complete 2/2 patients AMS  Reflexes:  Biceps, brachioradialis, patellar, Achilles 2+ and symmetric.  No ankle clonus.  Downgoing toe bilaterally.  Coordination:   No resting tremor.  Gait:  Deferred  Patient was able to squeeze medical student's hand, but did not follow commands during my examination. Patient was able to hold both arms up transiently, and would lower them slowly, showing voluntary control, avoiding hitting her face. Blinks to threat b/l. Withdraws to pain.       Significant Labs:   Hemoglobin A1c: No results for input(s): HGBA1C in the last 720 hours.  Blood Culture:   Recent Labs  Lab 08/01/18 2219 08/01/18  2220   LABBLOO No Growth to date No Growth to date     BMP:   Recent Labs  Lab 08/01/18 2219 08/02/18  0535    119*    140   K 4.5 3.5    106   CO2 25 25   BUN 5* 6   CREATININE 0.6 0.6   CALCIUM 8.9 8.8   MG 2.3  --      CBC:   Recent Labs  Lab 08/01/18 2219  08/02/18  0535   WBC 12.11 10.60   HGB 13.2 12.7   HCT 39.5 37.7    188     CMP:   Recent Labs  Lab 08/01/18 2219 08/02/18  0535    119*    140   K 4.5 3.5    106   CO2 25 25   BUN 5* 6   CREATININE 0.6 0.6   CALCIUM 8.9 8.8   MG 2.3  --    PROT 6.5  --    ALBUMIN 3.2*  --    BILITOT 0.4  --    ALKPHOS 52*  --    AST 24  --    ALT 10  --    ANIONGAP 9 9   EGFRNONAA >60.0 >60.0     CSF Culture: No results for input(s): CSFCULTURE in the last 48 hours.  CSF Studies: No results for input(s): ALIQUT, APPEARCSF, COLORCSF, CSFWBC, CSFRBC, GLUCCSF, LDHCSF, PROTEINCSF, VDRLCSF in the last 48 hours.  Inflammatory Markers:   Recent Labs  Lab 08/01/18 2219   SEDRATE 10   CRP 3.8     POCT Glucose:   Recent Labs  Lab 08/01/18 2219 08/02/18  0736   POCTGLUCOSE 103 123*     Prealbumin: No results for input(s): PREALBUMIN in the last 48 hours.  Respiratory Culture: No results for input(s): GSRESP, RESPIRATORYC in the last 48 hours.  Urine Culture: No results for input(s): LABURIN in the last 48 hours.  Urine Studies: No results for input(s): COLORU, APPEARANCEUA, PHUR, SPECGRAV, PROTEINUA, GLUCUA, KETONESU, BILIRUBINUA, OCCULTUA, NITRITE, UROBILINOGEN, LEUKOCYTESUR, RBCUA, WBCUA, BACTERIA, SQUAMEPITHEL, HYALINECASTS in the last 48 hours.    Invalid input(s): WRIGHTSUR  All pertinent lab results from the past 24 hours have been reviewed.    Significant Imaging:   No imaging studies from [facility] available for review at this time

## 2018-08-02 NOTE — CONSULTS
"[facility]  Neurology  Consult Note    Patient Name: Rupal Mcintyre  MRN: [id number]  Admission Date: 8/1/2018  Hospital Length of Stay: 1 days  Code Status: Full Code   Attending Provider: Mohsen Astorga MD   Consulting Provider: Olegario Strong MD  Primary Care Physician: Primary Doctor No  Principal Problem:Catatonia    Inpatient consult to Neurology  Consult performed by: OLEGARIO STRONG  Consult ordered by: ELKE CABA         Subjective:     Chief Complaint: Catatonia     HPI:   Patient  is a 28 y.o. female w/ past medical history significant for Depression, Hx of Catatonia (requiring inpatient psychiatric admission and ECT resulting in resolution of symptoms, 2015 at [facility]) presents as transfer from [facility] w/ c/c of catatonia, for Neurology and Psychiatry evaluation as well as possible 24 hr EEG. Patient is not conversant at this time so history is obtained from previous records. Per family, patient has had decreased PO intake since the onset of his symptoms. Per father, the previous episode of catatonia was preceded by the patient's mother's death and a break up w/ her boyfriend around that same time. Patient recently moved in to her grandmother's house when her own home was infested w/ snakes. Patient stated  that she has been depressed for "a while" according to her father. She follows with Dr Arenas in [place] and receives meds from [facility] in [place] (not open currently).     Upon arrival to [facility], patient was given 1 mg of Ativan, and was alert and oriented for some period of time. Did state to the physician upon arrival that she has had "bad depression". Patient only takes Xanax PRN at home; according to note review she does not take any other medication regularly. Only complaint on arrival was neck pain.     Of noted, patient was also found to have elevated WBC on second day of admission at OSH, w/ BCx 1/2 positive for  GPC (Enterococcus Faecalis) and " was started on Vanc/ Rocephin. LP performed w/ the following results 0 WBC, 11 RBC, clear, negative xanthrochromia. CSF encephalitis panel and HSV PCR pending. 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 Repeats Cx negative. TTE negative for vegetation per notes on 8/1.     Past Medical History:   Diagnosis Date    Catatonia 2015    Depression with prior catatonia    Hypertension     Miscarriage 2012       History reviewed. No pertinent surgical history.    Review of patient's allergies indicates:   Allergen Reactions    Vancomycin analogues Rash     Red man syndrome       Current Neurological Medications:   Lorazepam 2 mg IV Q6 Hrs    No current facility-administered medications on file prior to encounter.      No current outpatient prescriptions on file prior to encounter.     Family History     Problem Relation (Age of Onset)    Arthritis Father    Bipolar disorder Brother    Cancer Mother, Father, Paternal Grandmother    Depression Father    Heart disease Father        Social History Main Topics    Smoking status: Current Some Day Smoker     Packs/day: 0.25    Smokeless tobacco: Not on file    Alcohol use Yes      Comment: occasional    Drug use: No    Sexual activity: Not on file     Review of Systems   Unable to perform ROS: Patient nonverbal   Constitutional: Positive for appetite change.   Psychiatric/Behavioral: Positive for decreased concentration and dysphoric mood.     Objective:     Vital Signs (Most Recent):  Temp: 98.9 °F (37.2 °C) (08/02/18 0734)  Pulse: 89 (08/02/18 0734)  Resp: 18 (08/02/18 0734)  BP: 120/77 (08/02/18 0734)  SpO2: 97 % (08/02/18 0734) Vital Signs (24h Range):  Temp:  [97.3 °F (36.3 °C)-99.8 °F (37.7 °C)] 98.9 °F (37.2 °C)  Pulse:  [78-97] 89  Resp:  [12-18] 18  SpO2:  [96 %-98 %] 97 %  BP: (113-128)/(61-87) 120/77     Weight: 68 kg (150 lb)  Body mass index is 25.75  kg/m².    Physical Exam   Constitutional: She appears well-developed and well-nourished.   Unresponsive and non-cooperative during the exam    HENT:   Head: Normocephalic and atraumatic.   Eyes: EOM are normal. Pupils are equal, round, and reactive to light.   Patient w/ normal exra-ocular movements when medical student was examining her, did not follow commands while examined by me    Neck: No JVD present.   Cardiovascular: Normal rate and regular rhythm.    No murmur heard.  Pulmonary/Chest: Effort normal and breath sounds normal. No respiratory distress.   Abdominal: Soft. There is no tenderness.   Musculoskeletal: She exhibits edema. She exhibits no deformity.   Edematous L UE, likely 2/2 IV infiltration   Neurological: She is alert.   Eyes open, does not follow command.    Skin: Skin is warm and dry.   Psychiatric: She is withdrawn. She is noncommunicative.   Catatonic/not-resposive       NEUROLOGICAL EXAMINATION:     CRANIAL NERVES     CN III, IV, VI   Pupils are equal, round, and reactive to light.  Extraocular motions are normal.     Neurologic Exam:  Mental Status:  Alert, non-verbal.  Intermittently follows simple commands, depending on the examiner   Cranial Nerves: PERRLA, EOMI, although does not follow commands consistently.  No noted facial droop/assymetry .    Motor:  Normal bulk and tone.  Unable to test strength 2/2 patient's inability to follow commands.  Sensory:  Unable to complete 2/2 patients AMS  Reflexes:  Biceps, brachioradialis, patellar, Achilles 2+ and symmetric.  No ankle clonus.  Downgoing toe bilaterally.  Coordination:   No resting tremor.  Gait:  Deferred  Patient was able to squeeze medical student's hand, but did not follow commands during my examination. Patient was able to hold both arms up transiently, and would lower them slowly, showing voluntary control, avoiding hitting her face. Blinks to threat b/l. Withdraws to pain.       Significant Labs:   Hemoglobin A1c: No results  for input(s): HGBA1C in the last 720 hours.  Blood Culture:   Recent Labs  Lab 08/01/18 2219 08/01/18  2220   LABBLOO No Growth to date No Growth to date     BMP:   Recent Labs  Lab 08/01/18 2219 08/02/18  0535    119*    140   K 4.5 3.5    106   CO2 25 25   BUN 5* 6   CREATININE 0.6 0.6   CALCIUM 8.9 8.8   MG 2.3  --      CBC:   Recent Labs  Lab 08/01/18 2219 08/02/18  0535   WBC 12.11 10.60   HGB 13.2 12.7   HCT 39.5 37.7    188     CMP:   Recent Labs  Lab 08/01/18 2219 08/02/18  0535    119*    140   K 4.5 3.5    106   CO2 25 25   BUN 5* 6   CREATININE 0.6 0.6   CALCIUM 8.9 8.8   MG 2.3  --    PROT 6.5  --    ALBUMIN 3.2*  --    BILITOT 0.4  --    ALKPHOS 52*  --    AST 24  --    ALT 10  --    ANIONGAP 9 9   EGFRNONAA >60.0 >60.0     CSF Culture: No results for input(s): CSFCULTURE in the last 48 hours.  CSF Studies: No results for input(s): ALIQUT, APPEARCSF, COLORCSF, CSFWBC, CSFRBC, GLUCCSF, LDHCSF, PROTEINCSF, VDRLCSF in the last 48 hours.  Inflammatory Markers:   Recent Labs  Lab 08/01/18 2219   SEDRATE 10   CRP 3.8     POCT Glucose:   Recent Labs  Lab 08/01/18 2219 08/02/18  0736   POCTGLUCOSE 103 123*     Prealbumin: No results for input(s): PREALBUMIN in the last 48 hours.  Respiratory Culture: No results for input(s): GSRESP, RESPIRATORYC in the last 48 hours.  Urine Culture: No results for input(s): LABURIN in the last 48 hours.  Urine Studies: No results for input(s): COLORU, APPEARANCEUA, PHUR, SPECGRAV, PROTEINUA, GLUCUA, KETONESU, BILIRUBINUA, OCCULTUA, NITRITE, UROBILINOGEN, LEUKOCYTESUR, RBCUA, WBCUA, BACTERIA, SQUAMEPITHEL, HYALINECASTS in the last 48 hours.    Invalid input(s): ABRIL  All pertinent lab results from the past 24 hours have been reviewed.    Significant Imaging:   No imaging studies from [facility] available for review at this time     Assessment and Plan:     * Catatonia    29 yo female w/ Hs depression and tHx of catatonia which  responded well to ECT in 2015 at [facility] now presents w/ similar symptoms as a transfer for Psychiatry and Neurology evaluation    - Patient catatonia and unresponsive on my exam, although this waxes and vanes and changes based on the examiner  - Patient w/o any evidence of seizures, strokes. Family at bedside denies any preceding hallucinations, changes in personality.   - Family also reports that although she has been increasingly more withdrawn at home prior to this hospitalization, she was able to ambulate to the bathroom as well as bathe on her own without any prompting or assistance.  - Agree w/ Ativan trial at this time as previously showed intermittent improvement after administration; up-titration per psychiatry   - Based on the examination and history likely etiology is psychiatric rather that neurological  - Would appreciate OSH imaging if available for review rather than reports only  - EEG not recommended at this time; can consider if patient is unresponsive to psychiatric interventions that have previously worked.          Major depressive disorder, severe    - Psychiatry consulted  - Patient does not appear to be on medication at home currently         Bacteremia    - Per primary team   - Currently on Linezolid  - No sign of infection and only 1 positive culture at the OSH             VTE Risk Mitigation         Ordered     enoxaparin injection 40 mg  Daily      08/01/18 2139     IP VTE LOW RISK PATIENT  Once      08/01/18 2104          Thank you for your consult. I will sign off. Please contact us if you have any additional questions.    Cecilia Strong MD  Neurology  [facility]

## 2018-08-02 NOTE — H&P
"[facility] Hospital Medicine  History & Physical    Patient Name: Rupal Mcintyre  MRN: [id number]  Admission Date: 8/1/2018  Attending Physician: Mohsen Astorga MD   Primary Care Provider: Primary Doctor [facility] Medicine Team: [facility] MED B Imelda Yarbrough MD     Patient information was obtained from patient, parent, past medical records and ER records.     Subjective:     Principal Problem:<principal problem not specified>    Chief Complaint:   Chief Complaint   Patient presents with    Altered Mental Status        HPI: Ms Rupal Mcintyre is a 28 y.o. woman with depression and prior catatonia who presents as transfer from [facility] for catatonia nad Neuro/Psych evaluation.     Review of [facility] Course:  - admit 7/30 with AMS and catatonia. Notes say father provided intake data  - /66,   - Labs on admit 7/29/18: Na 150, K 4-> 2.8 (7/30) Cl 118, Cr 1, Lactic 2.7--> 4.1 (7/30)--> 0.8 (7/30). WBC 7-> 18.5 (7/30)-> 8.2 (7/31), Hgb 8.2, Plt 151. UA Spec grav >1.030. UDS negative, APAP and ASA negative. CXR unremarkable  - CTH "unremarkable" per notes  - MRI/MRA brain without contrast 7/31: no acute intracranial abnormality, benign cerebellar tonsillar ectopia  - started on vanc and ceftriaxone for possible meningitis. LP performed- 0 WBC, 11 RBC, clear, negative xanthrochromia. CSF encephalitis panel and HSV PCR pending. 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 Vanc changed to linezolid  - started on IVF and clinimix for dehydration  - on 7/31, notes state patient is awake and oriented, talks about being unhappy that mom and grandmother have passed away and because of moving away. Notes then state that patient became unresponsive again. Started on lexapro 10mg daily  - 7/29 blood cultures 1/2 postive for GPC (probable E faecalis per notes), repeats negative. TTE negative for vegetation per notes on 8/1  - per notes, no family has been at bedside and family has been " "unable to be contacted since admission  - transferred to [facility] 8/1 for Neuro and Psych consults    Patient is lying in bed with eyes open but does not respond to questions. Called father Kennedy Mcintyre- he states this happened 3 years ago and was hospitalized at [facility] for catatonia associated with depression. Required ECT which was successful. Became unresponsive again 6-7 days ago off and on. She had decreased PO intake. Father states that first episode was triggered by patient's mother's death. He thinks that paternal grandmother's death (3 years ago) and recent move into her grandmother's house due to snakes infesting her trailer (3 months ago). This move made patient depressed according to her father. He also thinks that break up with her boyfriend (3-4 years ago). may have triggered it. Patient says that she has been depressed for "a while" according to her father. She follows with Dr Arenas in [place] and receives meds from [facility] in [place] (not open currently).     After giving 1mg ativan x1, patient woke up. She was disoriented but then realized she was in [place]. She says she has had "bad depression" but could not elaborate further. She says she takes xanax PRN and no other psych meds. She complains of pain in the bad of her neck.     Past Medical History:   Diagnosis Date    Catatonia 2015    Depression with prior catatonia    Hypertension     Miscarriage 2012       Past Surgical History:   Procedure Laterality Date    TONSILLECTOMY         Review of patient's allergies indicates:   Allergen Reactions    Vancomycin analogues Rash     Red man syndrome       No current facility-administered medications on file prior to encounter.      No current outpatient prescriptions on file prior to encounter.     Family History     None        Social History Main Topics    Smoking status: Current Some Day Smoker    Smokeless tobacco: Not on file    Alcohol use Not on file    Drug use: " Unknown    Sexual activity: Not on file     Review of Systems   Unable to perform ROS: Mental status change     Objective:     Vital Signs (Most Recent):  Temp: 99.8 °F (37.7 °C) (08/01/18 1938)  Pulse: 83 (08/01/18 1938)  Resp: 16 (08/01/18 1938)  BP: 122/74 (08/01/18 1938)  SpO2: 97 % (08/01/18 1938) Vital Signs (24h Range):  Temp:  [97.3 °F (36.3 °C)-99.8 °F (37.7 °C)] 99.8 °F (37.7 °C)  Pulse:  [78-83] 83  Resp:  [12-16] 16  SpO2:  [97 %-98 %] 97 %  BP: (114-122)/(70-87) 122/74        There is no height or weight on file to calculate BMI.    Physical Exam   Constitutional: She appears well-developed and well-nourished. No distress.   obese   HENT:   Head: Normocephalic and atraumatic.   Nose: Nose normal.   Wound not open mouth for exam. Appears dry   Eyes: Conjunctivae and EOM are normal. Pupils are equal, round, and reactive to light. No scleral icterus.   Pupils dilated at 5mm bilaterally   Neck: Neck supple. No JVD present. No thyromegaly present.   hirsutism   Cardiovascular: Normal rate, regular rhythm, normal heart sounds and intact distal pulses.  Exam reveals no gallop and no friction rub.    No murmur heard.  Pulmonary/Chest: Effort normal and breath sounds normal. No respiratory distress. She has no wheezes. She has no rales.   Room air   Abdominal: Soft. Bowel sounds are normal. She exhibits no distension and no mass. There is no tenderness. There is no guarding.   Genitourinary:   Genitourinary Comments: Guillaume in place   Musculoskeletal: She exhibits no edema, tenderness or deformity.   Lymphadenopathy:     She has no cervical adenopathy.   Neurological: She is alert. She has normal reflexes. She displays no atrophy and no tremor. She exhibits normal muscle tone. She displays no seizure activity.   Does not respond to commands. Blinked to confrontation once, but then did not. Slightly molded her hand around mine on the R. No response with the L hand. No response to Babinski   Skin: Skin is warm and  dry. No rash noted. She is not diaphoretic. No erythema. No pallor.   Nursing note and vitals reviewed.        CRANIAL NERVES     CN III, IV, VI   Pupils are equal, round, and reactive to light.  Extraocular motions are normal.        Significant Labs: All pertinent labs within the past 24 hours have been reviewed.    Significant Imaging: I have reviewed and interpreted all pertinent imaging results/findings within the past 24 hours.    Assessment/Plan:     Coagulopathy    INR 1.4  May be nutritional vitamin K deficiency given long NPO status  Vitamin K SQ x1          Bacteremia    - 1/2 cultures positive on admit to OSH for gram positive. Notes from OSH state thought to be E. Faecalis. TTE performed and negative there. Repeat blood cultures negative. ?contaminant?  - repeat blood cultures x2  - continue linezolid for now- will need to call OSH for culture results to narrow          Catatonia    - OSH workup including LP, MRI/MRA, CTH negative  - check HIV, RPR, TSH  - vEEG ordered  - not malignant- no fever, no rigidity, no autonomic instability  - start ativan 1mg TID. Patient woke up after receiving first dose. May need to increase or patient may need ECT again  - Psych and Neuro consults  - Nutrition consult for tube feeding/PPN/TPN recs since patient has not been eating for almost 1 week  - start IVF now and clear liquids for when patient is awake            VTE Risk Mitigation         Ordered     enoxaparin injection 40 mg  Daily      08/01/18 2139     IP VTE LOW RISK PATIENT  Once      08/01/18 2104             Imelda Yarbrough MD  Department of Hospital Medicine   [facility]

## 2018-08-02 NOTE — PLAN OF CARE
Primary Doctor No     Payor: MEDICAID / Plan: [place] [employer] / Product Type: Managed Medicaid /      Extended Emergency Contact Information  Primary Emergency Contact: Kennedy Mcintyre  Address: [address]           [place] [address] [facility]  Home Phone: [phone]  Mobile Phone: [phone]  Relation: Father  Preferred language: English        08/02/18 1242   Discharge Assessment   Assessment Type Discharge Planning Assessment   Confirmed/corrected address and phone number on facesheet? Yes   Assessment information obtained from? Caregiver;Medical Record   Expected Length of Stay (days) 3   Communicated expected length of stay with patient/caregiver yes   Prior to hospitilization cognitive status: Alert/Oriented   Prior to hospitalization functional status: Independent   Current cognitive status: (Unable to Assess)   Current Functional Status: Needs Assistance   Lives With parent(s)   Able to Return to Prior Arrangements unable to determine at this time (comments)   Is patient able to care for self after discharge? Unable to determine at this time (comments)   Patient's perception of discharge disposition home or selfcare   Readmission Within The Last 30 Days no previous admission in last 30 days   Patient currently being followed by outpatient case management? No   Patient currently receives any other outside agency services? No   Equipment Currently Used at Home none   Do you have any problems affording any of your prescribed medications? No   Is the patient taking medications as prescribed? yes   Does the patient have transportation home? Yes   Transportation Available family or friend will provide   Does the patient receive services at the Coumadin Clinic? No   Discharge Plan A Home with family   Discharge Plan B Home with family;Home Health   Patient/Family In Agreement With Plan yes

## 2018-08-02 NOTE — SUBJECTIVE & OBJECTIVE
Patient History           Medical as of 8/2/2018     Past Medical History     Diagnosis Date Comments Source    Catatonia 2015 -- Provider    Depression with prior catatonia -- Provider    Hypertension -- -- Provider    Miscarriage 2012 -- Provider                  Surgical as of 8/2/2018    Past Surgical History: Patient provided no pertinent surgical history.           Family as of 8/2/2018     Problem Relation Name Age of Onset Comments Source    Cancer Mother -- -- colon Provider    Cancer Father -- -- prostate Provider    Heart disease Father -- -- -- Provider    Arthritis Father -- -- -- Provider    Depression Father -- -- -- Provider    Bipolar disorder Brother -- -- -- Provider    Cancer Paternal Grandmother -- -- bone Provider            Tobacco Use as of 8/2/2018     Smoking Status Smoking Start Date Smoking Quit Date Packs/day Years Used    Current Some Day Smoker -- -- 0.25 --    Types Comments Smokeless Tobacco Status Smokeless Tobacco Quit Date Source    -- -- Unknown -- Provider            Alcohol Use as of 8/2/2018     Alcohol Use Drinks/Week Alcohol/Week Comments Source    Yes -- -- occasional Provider            Drug Use as of 8/2/2018     Drug Use Types Frequency Comments Source    No -- -- -- Provider            Sexual Activity as of 8/2/2018     Sexually Active Birth Control Partners Comments Source    -- -- -- -- Provider            Activities of Daily Living as of 8/2/2018    **None**           Social Documentation as of 8/2/2018    **None**           Occupational as of 8/2/2018    **None**           Socioeconomic as of 8/2/2018     Marital Status Spouse Name Number of Children Years Education Preferred Language Ethnicity Race Source    Single -- -- -- English /White White --         Pertinent History Q A Comments    as of 8/2/2018 Lives with      Place in Birth Order      Lives in      Number of Siblings      Raised by      Legal Involvement      Childhood Trauma      Criminal  "History of      Financial Status      Highest Level of Education      Does patient have access to a firearm?       Service      Primary Leisure Activity      Spirituality       Past Medical History:   Diagnosis Date    Catatonia 2015    Depression with prior catatonia    Hypertension     Miscarriage 2012     History reviewed. No pertinent surgical history.  Family History     Problem Relation (Age of Onset)    Arthritis Father    Bipolar disorder Brother    Cancer Mother, Father, Paternal Grandmother    Depression Father    Heart disease Father        Social History Main Topics    Smoking status: Current Some Day Smoker     Packs/day: 0.25    Smokeless tobacco: Not on file    Alcohol use Yes      Comment: occasional    Drug use: No    Sexual activity: Not on file     Review of patient's allergies indicates:   Allergen Reactions    Vancomycin analogues Rash     Red man syndrome       No current facility-administered medications on file prior to encounter.      No current outpatient prescriptions on file prior to encounter.     Psychotherapeutics     Start     Stop Route Frequency Ordered    08/01/18 2145  lorazepam injection 1 mg      -- IV 3 times daily 08/01/18 2144        Review of Systems  Strengths and Liabilities: Strength: Patient is physically healthy.    Objective:     Vital Signs (Most Recent):  Temp: 98.9 °F (37.2 °C) (08/02/18 0734)  Pulse: 89 (08/02/18 0734)  Resp: 18 (08/02/18 0734)  BP: 120/77 (08/02/18 0734)  SpO2: 97 % (08/02/18 0734) Vital Signs (24h Range):  Temp:  [97.3 °F (36.3 °C)-99.8 °F (37.7 °C)] 98.9 °F (37.2 °C)  Pulse:  [78-97] 89  Resp:  [12-18] 18  SpO2:  [96 %-98 %] 97 %  BP: (113-128)/(61-87) 120/77     Height: 5' 4" (162.6 cm)  Weight: 68 kg (150 lb)  Body mass index is 25.75 kg/m².    No intake or output data in the 24 hours ending 08/02/18 1108    Physical Exam     Significant Labs: All pertinent labs within the past 24 hours have been reviewed.    Significant " Imaging: I have reviewed all pertinent imaging results/findings within the past 24 hours.

## 2018-08-02 NOTE — PLAN OF CARE
Problem: Patient Care Overview  Goal: Plan of Care Review  Outcome: Ongoing (interventions implemented as appropriate)  1. Recommend TF: Isosource 1.5 @ 45 ml/hr. Provides 1620 kcal ( 97%EEN),  73 gm protein (107% EPN), and 825 ml free water. Fluid flushes per MD. Hold for residuals >500ml.  2. If patient cannot tolerate TF and central line is placed, recommend TPN: Clinimix E 5/15 w/ 20% Lipids @ 65ml/hr to provide 1608 kcal (96% EEN), 78 g pro (114%), and 1560 ml fluid.   3. If central line is not placed, recommend PPN: Clinimix 4.25/10 w/ 20% lipids @ 80 ml/hr to provide 1479 kcal (89% EEN) 81.6 g pro (120% EPN), and 1920 ml fluid.   4. ADAT to Adult Regular   5. RD to follow     Goals: Pt meeting >75% EEN and EPN at RD follow up  Nutrition Goal Status: new

## 2018-08-02 NOTE — HPI
"Patient  is a 28 y.o. female w/ past medical history significant for Depression, Hx of Catatonia (requiring inpatient psychiatric admission and ECT resulting in resolution of symptoms, 2015 at [facility]) presents as transfer from [facility] w/ c/c of catatonia, for Neurology and Psychiatry evaluation as well as possible 24 hr EEG. Patient is not conversant at this time so history is obtained from previous records. Per family, patient has had decreased PO intake since the onset of his symptoms. Per father, the previous episode of catatonia was preceded by the patient's mother's death and a break up w/ her boyfriend around that same time. Patient recently moved in to her grandmother's house when her own home was infested w/ snakes. Patient stated  that she has been depressed for "a while" according to her father. She follows with Dr Arenas in [place] and receives meds from [facility] in [place] (not open currently).     [place] [facility] was given 1 mg of Ativan, and was alert and oriented for some period of time. Did state to the physician upon arrival that she has had "bad depression". Patient only takes Xanax PRN at home; according to note review she does not take any other medication regularly. Only complaint on arrival was neck pain.     Of noted, patient was also found to have elevated WBC on second day of admission at OSH, w/ BCx 1/2 positive for  GPC (Enterococcus Faecalis) and was started on Vanc/ Rocephin. LP performed w/ the following results 0 WBC, 11 RBC, clear, negative xanthrochromia. CSF encephalitis panel and HSV PCR pending. 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 Repeats Cx negative. TTE negative for vegetation per notes on 8/1.  "

## 2018-08-02 NOTE — HOSPITAL COURSE
08/02/2018 Transferred to [facility] from [facility] for evaluation by Psychiatry/Neurology and 24 hr EEG. Catatonic, although waxing and waning

## 2018-08-02 NOTE — PROGRESS NOTES
[facility]  Psychiatry  Progress Note    Patient Name: Rupal Mcintyre  MRN: [id number]   Code Status: Full Code  Admission Date: 8/1/2018  Hospital Length of Stay: 1 days  Expected Discharge Date: 8/6/2018  Attending Physician: Mohsen Astorga MD  Primary Care Provider: Primary Doctor No    Current Legal Status: N/A    Patient information was obtained from patient, past medical records and ER records.     Subjective:     Principal Problem:<principal problem not specified>    Chief Complaint: unable to obtain due to catatonic presentation.    HPI: Rupal Mcintyre is a 28 y.o. female with a history of depression and ADHD who presented to [facility] due to AMS. Psychiatry was consulted to address the patient's symptom of catatonia.    HPI per Primary Team:   Patient experienced an episode of catatonia in 2015 after the death of her mother, which led to a stint in an inpatient psychiatric facility. She was found altered and brought to the ED (nobody can provide collateral details of this, and she has had no family visit or return calls).  Since admit she will only blink and stare ahead.  At admission she was dehydrated with an elevated lactic acid and Na level.  This morning she became slightly more lucid and was alert x3, only to return to her previous catatonic state. At admission her WBC was normal, then merry to 18k, then returned to normal.  She had blood cxs done, which turned positive 1 of 2 with GPC (too early to know if chains or clusters), for which she was started on Vancomyin and Rocephin.  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  The sending MD was unable to perform a LP due to her being given Lovenox for DVT prophylaxis.    Patient is lying in bed with eyes open but does not respond to questions. Called father Kennedy Mcintyre- he states this happened 3 years ago and was hospitalized at [facility] for catatonia associated with depression.  "Required ECT which was successful. Became unresponsive again 6-7 days ago off and on. She had decreased PO intake. Father states that first episode was triggered by patient's mother's death. He thinks that paternal grandmother's death (3 years ago) and recent move into her grandmother's house due to snakes infesting her trailer (3 months ago). This move made patient depressed according to her father. He also thinks that break up with her boyfriend (3-4 years ago). may have triggered it. Patient says that she has been depressed for "a while" according to her father. She follows with Dr Arenas in [place] and receives meds from "[facility] pharmacy in [place] (not open currently).      After giving 1mg ativan x1, patient woke up. She was disoriented but then realized she was in [place]. She says she has had "bad depression" but could not elaborate further. She says she takes xanax PRN and no other psych meds. She complains of pain in the bad of her neck.     Psychiatry Consult:  HPI limited due to patient's current condition. Patient demonstrates mutism and stupor with no reactivity to painful stimuli. Patient has a blank stare and only blinks. Upon lifting her arm into the arm, her arm immediately drops down. Per patient's sitter, the patient has remained in the same position for the entire duration that the sitter has been with her (approximately 3 hours). Sitter states that when the patient has been moved to a different position, she will return back to her initial position. The sitter reports that the patient has grunted on a few occasions, but has not spoken at all. Unable to assess for mood, SI, HI, and AVH.    Per the Luther-Alvino Catatonia Rating Scale, the patient is positive for immobility/stupor (3 pts), mutism (3pts), staring (3 pts), and negativism (3pts), which is indicative of catatonia. The patient also failed CAM-ICU (demonstrated AMS, failed SAVEAHAART test for inattention, and demonstrated " altered level of consciousness), which is indicative of delirium.    Per [facility] Chart Review:  [facility] evaluated the patient for catatonia on 1/8/2016. The patient was initially started on Ativan 1mg IV TID and Amantadine 100mg NG TID which was ineffective, so consent was obtained from the patient's father for ECT. The patient returned to baseline after two ECT sessions and was started on Celexa 20mg daily for MDD.    Collateral: Kennedy Mcintyre (father) [phone]    Psychiatric Review of Systems-is patient experiencing or having changes in  sleep: unable to assess  appetite: unable to assess  weight: unable to assess  energy/anergy: unable to assess  interest/pleasure/anhedonia: unable to assess  somatic symptoms: unable to assess  libido: unable to assess  anxiety/panic: unable to assess  guilty/hopelessness: unable to assess  concentration: unable to assess  S.I.B.s/risky behavior: unable to assess  any drugs: unable to assess  alcohol: unable to assess     History obtained from chart review and [facility] records due to patient's current condition:     Past Psychiatric History:  Previous Medication Trials: Cymbalta, Celexa, Adderall, Xanax, Ambien   Previous Psychiatric Hospitalizations: no   Previous Suicide Attempts: no   History of Violence: no  Outpatient Psychiatrist: Dr. Cormier in [place] ([phone])    Social History:  Marital Status: not   Children: 0   Employment Status/Info: currently unemployed  Education: 10th grade  Special Ed: no  Housing Status: grandmother's house  History of phys/sexual abuse: no  Access to gun: no    Substance Abuse History:  Recreational Drugs: none  Use of Alcohol: denied  Rehab History:no   Tobacco Use:no  Use of OTC: unknown    Legal History:  Past Charges/Incarcerations:yes, she and boyfriend were arrested for marijuana possession around ~2011   Pending charges:no     Family Psychiatric History:   Brother with depression and ADHD    Psychosocial Stressors:  recent move from trailer to grandmother's house  Functioning Relationships: good relationship with father    Hospital Course: No notes on file         Patient History           Medical as of 8/1/2018     Past Medical History     Diagnosis Date Comments Source    Catatonia 2015 -- Provider    Depression with prior catatonia -- Provider    Hypertension -- -- Provider    Miscarriage 2012 -- Provider                  Surgical as of 8/1/2018    Past Surgical History: Patient provided no pertinent surgical history.           Family as of 8/1/2018     Problem Relation Name Age of Onset Comments Source    Cancer Mother -- -- colon Provider    Cancer Father -- -- prostate Provider    Heart disease Father -- -- -- Provider    Arthritis Father -- -- -- Provider    Depression Father -- -- -- Provider    Bipolar disorder Brother -- -- -- Provider    Cancer Paternal Grandmother -- -- bone Provider            Tobacco Use as of 8/1/2018     Smoking Status Smoking Start Date Smoking Quit Date Packs/day Years Used    Current Some Day Smoker -- -- 0.25 --    Types Comments Smokeless Tobacco Status Smokeless Tobacco Quit Date Source    -- -- Unknown -- Provider            Alcohol Use as of 8/1/2018     Alcohol Use Drinks/Week Alcohol/Week Comments Source    Yes -- -- occasional Provider            Drug Use as of 8/1/2018     Drug Use Types Frequency Comments Source    No -- -- -- Provider            Sexual Activity as of 8/1/2018     Sexually Active Birth Control Partners Comments Source    -- -- -- -- Provider            Activities of Daily Living as of 8/1/2018    **None**           Social Documentation as of 8/1/2018    **None**           Occupational as of 8/1/2018    **None**           Socioeconomic as of 8/1/2018     Marital Status Spouse Name Number of Children Years Education Preferred Language Ethnicity Race Source    Single -- -- -- English /White White --         Pertinent History Q A Comments    as of 8/1/2018  "Lives with      Place in Birth Order      Lives in      Number of Siblings      Raised by      Legal Involvement      Childhood Trauma      Criminal History of      Financial Status      Highest Level of Education      Does patient have access to a firearm?       Service      Primary Leisure Activity      Spirituality       Past Medical History:   Diagnosis Date    Catatonia 2015    Depression with prior catatonia    Hypertension     Miscarriage 2012     History reviewed. No pertinent surgical history.  Family History     Problem Relation (Age of Onset)    Arthritis Father    Bipolar disorder Brother    Cancer Mother, Father, Paternal Grandmother    Depression Father    Heart disease Father        Social History Main Topics    Smoking status: Current Some Day Smoker     Packs/day: 0.25    Smokeless tobacco: Not on file    Alcohol use Yes      Comment: occasional    Drug use: No    Sexual activity: Not on file     Review of patient's allergies indicates:   Allergen Reactions    Vancomycin analogues Rash     Red man syndrome       No current facility-administered medications on file prior to encounter.      No current outpatient prescriptions on file prior to encounter.     Psychotherapeutics     Start     Stop Route Frequency Ordered    08/01/18 2145  lorazepam injection 1 mg      -- IV 3 times daily 08/01/18 2144        Review of Systems   Unable to perform ROS: Mental status change     Strengths and Liabilities: unable to assess    Objective:     Vital Signs (Most Recent):  Temp: 99.8 °F (37.7 °C) (08/01/18 1938)  Pulse: 83 (08/01/18 1938)  Resp: 16 (08/01/18 1938)  BP: 122/74 (08/01/18 1938)  SpO2: 97 % (08/01/18 1938) Vital Signs (24h Range):  Temp:  [97.3 °F (36.3 °C)-99.8 °F (37.7 °C)] 99.8 °F (37.7 °C)  Pulse:  [78-83] 83  Resp:  [12-16] 16  SpO2:  [97 %-98 %] 97 %  BP: (114-122)/(70-87) 122/74     Height: 5' 4" (162.6 cm)  Weight: 68 kg (150 lb)  Body mass index is 25.75 kg/m².    No intake " "or output data in the 24 hours ending 08/01/18 2170    Physical Exam   Constitutional: She appears well-developed and well-nourished.   HENT:   Head: Normocephalic and atraumatic.   Eyes: Conjunctivae are normal. Pupils are equal, round, and reactive to light. No scleral icterus.   Cardiovascular: Normal rate, regular rhythm, normal heart sounds and intact distal pulses.    Pulmonary/Chest: Effort normal and breath sounds normal.   Abdominal: Soft. Bowel sounds are normal. She exhibits no distension.   Neurological: She is unresponsive.   Skin: Skin is warm and dry. No rash noted. No erythema. No pallor.   Psychiatric:   Mental Status Exam:  Appearance: unremarkable, age appropriate  Behavior/Cooperation: stuporous, blank stare  Speech: mute  Mood: unable to assess  Affect: unable to assess  Thought Process: unable to assess  Thought Content: unable to assess  Perception: unable to assess  Orientation: unable to assess  Memory: unable to assess  Attention Span/Concentration: unable to assess; failed CAM-ICU  Insight: unable to assess  Judgment: unable to assess     CAM-ICU:  1. Acute change and/or fluctuating course of mental status: Yes  2. Inattention (SAVEAHAART): Yes  · "Squeeze my hand, only when you hear, the letter 'A'."  3. Altered Level of Consciousness: Yes  4. Disorganized Thinking (Errors >1/6): unable to assess  · "Will a stone float on water?"  · "Are there fish in the sea?"  · "Does one pound weigh more than two?"  · "Can you use a hammer to pound a nail?"  · Command(s):  · "Hold up 2 fingers."  · "Now do the same thing with the other hand."    Score: 1+2 AND, either 3 or 4 present = Positive CAM-ICU    NEUROLOGICAL EXAMINATION:     MENTAL STATUS   Level of consciousness: unresponsive to painful stimuli    CRANIAL NERVES     CN III, IV, VI   Pupils are equal, round, and reactive to light.       Unable to assess due to current patient condition     Significant Labs: All pertinent labs within the past " 24 hours have been reviewed.   Recent Results (from the past 48 hour(s))   POCT glucose    Collection Time: 08/01/18 10:19 PM   Result Value Ref Range    POCT Glucose 103 70 - 110 mg/dL      No results found for: PHENYTOIN, PHENOBARB, VALPROATE, CBMZ    Significant Imaging: None    Assessment/Plan:     Major depressive disorder, severe    - Previous trials of Cymbalta and Celexa  - Consider addition of antidepressant after Zyvox treatment has been completed and coordinate with outpatient psychiatrist Dr. Cormier        Catatonia    - Per the Luther-Alvino Catatonia Rating Scale, the patient is positive for immobility/stupor (3 pts), mutism (3pts), staring (3 pts), and negativism (3pts), indicative of retarded catatonia  - Previous catatonic episode successfully treated with ECT at [facility] in January 2016  - Increase Ativan IV to 2 mg PO every 6 hours.  - If no improvement with Ativan, would try to obtain consent from patient's father for ECT  - Continue Lovenox DVT prophylaxis due to patient's current immobility/stupor  - Continue to monitor for dehydration and malnutrition by monitoring I/O, continuing IVF, and nutrition consult for tube feeds vs TPN/PPN             Need for Continued Hospitalization:   Requires ongoing hospitalization for stabilization of medications.    Anticipated Disposition: Still a Patient     Total time:  35 with greater than 50% of this time spent in counseling and/or coordination of care.       Ponce Anand MD   Psychiatry  [facility]

## 2018-08-02 NOTE — MEDICAL/APP STUDENT
Hospital Medicine  Progress note    Team: [facility] HOSP MED B Sergey Anderson  Admit Date: 8/1/2018  KRISHNA 8/6/2018  Code status: Full Code    Principal Problem:  <principal problem not specified>    Interval hx: Psych recommends increase ativan IV to 2mg PO q6hrs, if no improvement would obtain consent from father for ECT. Would consider addition of antidepressant after Zyvox treatment has been completed and outpatient coordination with Dr Cormier.      ROS   Unable to perform ROS: Mental status change       PEx  Temp:  [97.3 °F (36.3 °C)-99.8 °F (37.7 °C)]   Pulse:  [82-97]   Resp:  [16-20]   BP: (113-131)/(61-81)   SpO2:  [96 %-98 %]   No intake or output data in the 24 hours ending 08/02/18 1235     Constitutional: She appears well-developed and well-nourished. No distress. obese   Cardiovascular: Normal rate, regular rhythm, normal heart sounds and intact distal pulses.  Exam reveals no gallop and no friction rub.    No murmur heard.  Pulmonary/Chest: Effort normal and breath sounds normal. No respiratory distress. She has no wheezes. She has no rales. Room air   Abdominal: Soft. Bowel sounds are normal. She exhibits no distension and no mass. There is no tenderness.  Neurological: She is alert. She has normal reflexes. She displays no atrophy and no tremor. She exhibits normal muscle tone. She displays no seizure activity.   Does not respond to commands. Blinked to confrontation once, but then did not. Slightly molded her hand around mine on the R. No response with the L hand. No response to Babinski   Skin: Skin is warm and dry. No rash noted. She is not diaphoretic. No erythema. No pallor.   Nursing note and vitals reviewed.      Recent Labs  Lab 08/01/18 2219 08/02/18  0535   WBC 12.11 10.60   HGB 13.2 12.7   HCT 39.5 37.7    188       Recent Labs  Lab 08/01/18 2219 08/02/18  0535    140   K 4.5 3.5    106   CO2 25 25   BUN 5* 6   CREATININE 0.6 0.6    119*   CALCIUM 8.9 8.8   MG 2.3   --    PHOS 3.3  --        Recent Labs  Lab 08/01/18 2219 08/01/18  2220   ALKPHOS 52*  --    ALT 10  --    AST 24  --    ALBUMIN 3.2*  --    PROT 6.5  --    BILITOT 0.4  --    INR  --  1.4*        Recent Labs  Lab 08/01/18 2219 08/02/18  0736   POCTGLUCOSE 103 123*     Recent Labs      08/01/18 2219   CPK  183*       Scheduled Meds:   enoxaparin  40 mg Subcutaneous Daily    lidocaine  1 patch Transdermal Q24H    linezolid 600mg/300ml  600 mg Intravenous Q12H    lorazepam  1 mg Intravenous TID     Continuous Infusions:   dextrose 5 % and 0.45 % NaCl with KCl 20 mEq 125 mL/hr at 08/01/18 2210     As Needed:  sodium chloride 0.9%    Active Hospital Problems    Diagnosis  POA    Major depressive disorder, severe [F32.2]  Yes    Catatonia [F06.1]  Yes    Bacteremia [R78.81]  Yes    Coagulopathy [D68.9]  Yes      Resolved Hospital Problems    Diagnosis Date Resolved POA   No resolved problems to display.       Overview      Assessment and Plan for Problems addressed today:    Catatonia     - OSH workup including LP, MRI/MRA, CTH negative  - check HIV, RPR, TSH  - vEEG ordered  - not malignant- no fever, no rigidity, no autonomic instability  - start ativan 1mg TID. Patient woke up after receiving first dose. May need to increase or patient may need ECT again  - Psych and Neuro consults  - Nutrition consult for tube feeding/PPN/TPN recs since patient has not been eating for almost 1 week     Coagulopathy     INR 1.4  May be nutritional vitamin K deficiency given long NPO status  Vitamin K SQ x1             Bacteremia     - 1/2 cultures positive on admit to OSH for gram positive. Notes from OSH state thought to be E. Faecalis. TTE performed and negative there. Repeat blood cultures negative. ?contaminant?  - repeat blood cultures x2  - continue linezolid for now- will need to call OSH for culture results to narrow               Discharge plan and follow up    Provider    I personally scribed for Mohsen RUIZ  MD Rizwan on 08/02/2018 at 12:38 PM. Electronically signed by christian Anderson III on 08/02/2018 at 12:38 PM

## 2018-08-02 NOTE — SUBJECTIVE & OBJECTIVE
Past Medical History:   Diagnosis Date    Catatonia 2015    Depression with prior catatonia    Hypertension     Miscarriage 2012       Past Surgical History:   Procedure Laterality Date    TONSILLECTOMY         Review of patient's allergies indicates:   Allergen Reactions    Vancomycin analogues Rash     Red man syndrome       No current facility-administered medications on file prior to encounter.      No current outpatient prescriptions on file prior to encounter.     Family History     None        Social History Main Topics    Smoking status: Current Some Day Smoker    Smokeless tobacco: Not on file    Alcohol use Not on file    Drug use: Unknown    Sexual activity: Not on file     Review of Systems   Unable to perform ROS: Mental status change     Objective:     Vital Signs (Most Recent):  Temp: 99.8 °F (37.7 °C) (08/01/18 1938)  Pulse: 83 (08/01/18 1938)  Resp: 16 (08/01/18 1938)  BP: 122/74 (08/01/18 1938)  SpO2: 97 % (08/01/18 1938) Vital Signs (24h Range):  Temp:  [97.3 °F (36.3 °C)-99.8 °F (37.7 °C)] 99.8 °F (37.7 °C)  Pulse:  [78-83] 83  Resp:  [12-16] 16  SpO2:  [97 %-98 %] 97 %  BP: (114-122)/(70-87) 122/74        There is no height or weight on file to calculate BMI.    Physical Exam   Constitutional: She appears well-developed and well-nourished. No distress.   obese   HENT:   Head: Normocephalic and atraumatic.   Nose: Nose normal.   Wound not open mouth for exam. Appears dry   Eyes: Conjunctivae and EOM are normal. Pupils are equal, round, and reactive to light. No scleral icterus.   Pupils dilated at 5mm bilaterally   Neck: Neck supple. No JVD present. No thyromegaly present.   hirsutism   Cardiovascular: Normal rate, regular rhythm, normal heart sounds and intact distal pulses.  Exam reveals no gallop and no friction rub.    No murmur heard.  Pulmonary/Chest: Effort normal and breath sounds normal. No respiratory distress. She has no wheezes. She has no rales.   Room air   Abdominal:  Soft. Bowel sounds are normal. She exhibits no distension and no mass. There is no tenderness. There is no guarding.   Genitourinary:   Genitourinary Comments: Guillaume in place   Musculoskeletal: She exhibits no edema, tenderness or deformity.   Lymphadenopathy:     She has no cervical adenopathy.   Neurological: She is alert. She has normal reflexes. She displays no atrophy and no tremor. She exhibits normal muscle tone. She displays no seizure activity.   Does not respond to commands. Blinked to confrontation once, but then did not. Slightly molded her hand around mine on the R. No response with the L hand. No response to Babinski   Skin: Skin is warm and dry. No rash noted. She is not diaphoretic. No erythema. No pallor.   Nursing note and vitals reviewed.        CRANIAL NERVES     CN III, IV, VI   Pupils are equal, round, and reactive to light.  Extraocular motions are normal.        Significant Labs: All pertinent labs within the past 24 hours have been reviewed.    Significant Imaging: I have reviewed and interpreted all pertinent imaging results/findings within the past 24 hours.

## 2018-08-03 LAB
ANION GAP SERPL CALC-SCNC: 8 MMOL/L
ANISOCYTOSIS BLD QL SMEAR: SLIGHT
BASOPHILS # BLD AUTO: 0.06 K/UL
BASOPHILS NFR BLD: 0.8 %
BUN SERPL-MCNC: 3 MG/DL
CALCIUM SERPL-MCNC: 8.8 MG/DL
CHLORIDE SERPL-SCNC: 109 MMOL/L
CO2 SERPL-SCNC: 24 MMOL/L
CREAT SERPL-MCNC: 0.5 MG/DL
DACRYOCYTES BLD QL SMEAR: ABNORMAL
DIFFERENTIAL METHOD: ABNORMAL
EOSINOPHIL # BLD AUTO: 0.1 K/UL
EOSINOPHIL NFR BLD: 1.6 %
ERYTHROCYTE [DISTWIDTH] IN BLOOD BY AUTOMATED COUNT: 13.8 %
EST. GFR  (AFRICAN AMERICAN): >60 ML/MIN/1.73 M^2
EST. GFR  (NON AFRICAN AMERICAN): >60 ML/MIN/1.73 M^2
GLUCOSE SERPL-MCNC: 100 MG/DL
HCT VFR BLD AUTO: 35.8 %
HGB BLD-MCNC: 12.1 G/DL
HYPOCHROMIA BLD QL SMEAR: ABNORMAL
IMM GRANULOCYTES # BLD AUTO: 0.11 K/UL
IMM GRANULOCYTES NFR BLD AUTO: 1.5 %
LYMPHOCYTES # BLD AUTO: 2.4 K/UL
LYMPHOCYTES NFR BLD: 31.9 %
MCH RBC QN AUTO: 31.8 PG
MCHC RBC AUTO-ENTMCNC: 33.8 G/DL
MCV RBC AUTO: 94 FL
MONOCYTES # BLD AUTO: 0.7 K/UL
MONOCYTES NFR BLD: 9.7 %
NEUTROPHILS # BLD AUTO: 4.1 K/UL
NEUTROPHILS NFR BLD: 54.5 %
NRBC BLD-RTO: 0 /100 WBC
OVALOCYTES BLD QL SMEAR: ABNORMAL
PLATELET # BLD AUTO: 155 K/UL
PMV BLD AUTO: ABNORMAL FL
POCT GLUCOSE: 108 MG/DL (ref 70–110)
POCT GLUCOSE: 108 MG/DL (ref 70–110)
POCT GLUCOSE: 118 MG/DL (ref 70–110)
POIKILOCYTOSIS BLD QL SMEAR: SLIGHT
POLYCHROMASIA BLD QL SMEAR: ABNORMAL
POTASSIUM SERPL-SCNC: 4 MMOL/L
RBC # BLD AUTO: 3.81 M/UL
SODIUM SERPL-SCNC: 141 MMOL/L
WBC # BLD AUTO: 7.52 K/UL

## 2018-08-03 PROCEDURE — 99232 SBSQ HOSP IP/OBS MODERATE 35: CPT | Mod: ,,, | Performed by: PSYCHIATRY & NEUROLOGY

## 2018-08-03 PROCEDURE — 20600001 HC STEP DOWN PRIVATE ROOM

## 2018-08-03 PROCEDURE — 80048 BASIC METABOLIC PNL TOTAL CA: CPT

## 2018-08-03 PROCEDURE — 63600175 PHARM REV CODE 636 W HCPCS: Performed by: HOSPITALIST

## 2018-08-03 PROCEDURE — 36415 COLL VENOUS BLD VENIPUNCTURE: CPT

## 2018-08-03 PROCEDURE — 25000003 PHARM REV CODE 250: Performed by: HOSPITALIST

## 2018-08-03 PROCEDURE — 85025 COMPLETE CBC W/AUTO DIFF WBC: CPT

## 2018-08-03 PROCEDURE — 94761 N-INVAS EAR/PLS OXIMETRY MLT: CPT

## 2018-08-03 PROCEDURE — 99232 SBSQ HOSP IP/OBS MODERATE 35: CPT | Mod: ,,, | Performed by: INTERNAL MEDICINE

## 2018-08-03 PROCEDURE — 63600175 PHARM REV CODE 636 W HCPCS: Performed by: PSYCHIATRY & NEUROLOGY

## 2018-08-03 RX ADMIN — ENOXAPARIN SODIUM 40 MG: 100 INJECTION SUBCUTANEOUS at 05:08

## 2018-08-03 RX ADMIN — DEXTROSE MONOHYDRATE, SODIUM CHLORIDE, AND POTASSIUM CHLORIDE: 50; 4.5; 1.49 INJECTION, SOLUTION INTRAVENOUS at 09:08

## 2018-08-03 RX ADMIN — LINEZOLID 600 MG: 600 INJECTION, SOLUTION INTRAVENOUS at 09:08

## 2018-08-03 RX ADMIN — DEXTROSE MONOHYDRATE, SODIUM CHLORIDE, AND POTASSIUM CHLORIDE: 50; 4.5; 1.49 INJECTION, SOLUTION INTRAVENOUS at 03:08

## 2018-08-03 RX ADMIN — LINEZOLID 600 MG: 600 INJECTION, SOLUTION INTRAVENOUS at 11:08

## 2018-08-03 RX ADMIN — DEXTROSE MONOHYDRATE, SODIUM CHLORIDE, AND POTASSIUM CHLORIDE: 50; 4.5; 1.49 INJECTION, SOLUTION INTRAVENOUS at 06:08

## 2018-08-03 RX ADMIN — LORAZEPAM 1 MG: 2 INJECTION INTRAMUSCULAR; INTRAVENOUS at 09:08

## 2018-08-03 RX ADMIN — LIDOCAINE 1 PATCH: 50 PATCH TOPICAL at 12:08

## 2018-08-03 RX ADMIN — LIDOCAINE 1 PATCH: 50 PATCH TOPICAL at 09:08

## 2018-08-03 RX ADMIN — LORAZEPAM 1 MG: 2 INJECTION INTRAMUSCULAR; INTRAVENOUS at 05:08

## 2018-08-03 RX ADMIN — LINEZOLID 600 MG: 600 INJECTION, SOLUTION INTRAVENOUS at 12:08

## 2018-08-03 RX ADMIN — LORAZEPAM 1 MG: 2 INJECTION INTRAMUSCULAR; INTRAVENOUS at 10:08

## 2018-08-03 RX ADMIN — LORAZEPAM 1 MG: 2 INJECTION INTRAMUSCULAR; INTRAVENOUS at 02:08

## 2018-08-03 NOTE — PLAN OF CARE
Problem: Patient Care Overview  Goal: Plan of Care Review  Outcome: Ongoing (interventions implemented as appropriate)  POC reviewed with patient and family at bedside. Fall precautions maintained, medication education provided, and medical equipment information given. Family expressed concern about the patient's potential for recovery. MD discussed patient POC with family. Patient demonstrated some awareness of family's presence and responded with minor twitches in extremities; she took several sips of soup at breakfast. Refused other PO intake. No output this shift. IVF running at 125 resumed after PICC team placed right peripheral IV. Edema noted to left extremity, elevated. Patient turned q 2 hours per order.

## 2018-08-03 NOTE — SUBJECTIVE & OBJECTIVE
"Interval History: see hospital course    Family History     Problem Relation (Age of Onset)    Arthritis Father    Bipolar disorder Brother    Cancer Mother, Father, Paternal Grandmother    Depression Father    Heart disease Father        Social History Main Topics    Smoking status: Current Some Day Smoker     Packs/day: 0.25    Smokeless tobacco: Not on file    Alcohol use Yes      Comment: occasional    Drug use: No    Sexual activity: Not on file     Psychotherapeutics     Start     Stop Route Frequency Ordered    08/02/18 2100  lorazepam injection 1 mg      -- IV 4 times daily 08/02/18 7161           Review of Systems  Objective:     Vital Signs (Most Recent):  Temp: 99.1 °F (37.3 °C) (08/03/18 0800)  Pulse: 90 (08/03/18 0800)  Resp: 18 (08/03/18 0800)  BP: 119/78 (08/03/18 0800)  SpO2: 99 % (08/03/18 0800) Vital Signs (24h Range):  Temp:  [97.8 °F (36.6 °C)-99.1 °F (37.3 °C)] 99.1 °F (37.3 °C)  Pulse:  [78-92] 90  Resp:  [18-20] 18  SpO2:  [95 %-100 %] 99 %  BP: (119-139)/(68-78) 119/78     Height: 5' 4" (162.6 cm)  Weight: 68 kg (149 lb 14.6 oz)  Body mass index is 25.73 kg/m².    No intake or output data in the 24 hours ending 08/03/18 1006    Physical Exam    Mental Status Exam:  Appearance: unremarkable, appears stated age  Behavior/Cooperation: stuporous, blank stare  Speech: mute  Mood: unable to assess  Affect: unable to assess  Thought Process: unable to assess  Thought Content: unable to assess  Perception: unable to assess  Orientation: unable to assess  Memory: unable to assess  Attention Span/Concentration: unable to assess; failed CAM-ICU  Insight: unable to assess  Judgment: unable to assess     Significant Labs:   Last 24 Hours:   Recent Lab Results       08/03/18  0418 08/03/18  0100      Immature Granulocytes 1.5(H)      Immature Grans (Abs) 0.11  Comment:  Mild elevation in immature granulocytes is non specific and   can be seen in a variety of conditions including stress response, "   acute inflammation, trauma and pregnancy. Correlation with other   laboratory and clinical findings is essential.  (H)      Anion Gap 8      Aniso Slight      Baso # 0.06      Basophil% 0.8      BUN, Bld 3(L)      Calcium 8.8      Chloride 109      CO2 24      Creatinine 0.5      Differential Method Automated      eGFR if African American >60.0      eGFR if non  >60.0  Comment:  Calculation used to obtain the estimated glomerular filtration  rate (eGFR) is the CKD-EPI equation.         Eos # 0.1      Eosinophil% 1.6      Glucose 100      Gran # (ANC) 4.1      Gran% 54.5      Hematocrit 35.8(L)      Hemoglobin 12.1      Hypo Occasional      Lymph # 2.4      Lymph% 31.9      MCH 31.8(H)      MCHC 33.8      MCV 94      Mono # 0.7      Mono% 9.7      MPV SEE COMMENT  Comment:  Result not available.      nRBC 0      Ovalocytes Occasional      Platelets 155      POCT Glucose  118(H)     Poik Slight      Poly Occasional      Potassium 4.0      RBC 3.81(L)      RDW 13.8      Sodium 141      Tear Drop Cells Occasional      WBC 7.52            Significant Imaging: I have reviewed all pertinent imaging results/findings within the past 24 hours.

## 2018-08-03 NOTE — PLAN OF CARE
"Problem: Patient Care Overview  Goal: Plan of Care Review  Outcome: Ongoing (interventions implemented as appropriate)  Upon assessment Pt observed A/A in catatonic state. Pt follows minimal commands. VSS during shift. During rounding Pt exhibited intermittent alertness and orientation to self. This nurse assessed pain, Pt states, "My head hurts a little". Repositioned Pt, Lidocaine patch in place R back. Pt exhibited excessive salivation. This nurse provided oral care w/ swabbing. Pt tolerates well. Pt had several unmeasured incontinent episodes during shift. Bed linen changed, incontinence care provided. Call light in reach. Fall/Safety precautions maintained. Bed in lowest position, rails x2 applied. Will continue to monitor.      "

## 2018-08-03 NOTE — PROGRESS NOTES
[facility]  Psychiatry  Progress Note    Patient Name: Rupal Mcintyre  MRN: [id number]   Code Status: Full Code  Admission Date: 8/1/2018  Hospital Length of Stay: 2 days  Expected Discharge Date: 8/7/2018  Attending Physician: Mohsen Astorga MD  Primary Care Provider: Primary Doctor No    Current Legal Status: N/A    Patient information was obtained from patient, past medical records and ER records.     Subjective:     Principal Problem:Catatonia    Chief Complaint: unable to obtain    HPI: Rupal Mcintyre is a 28 y.o. female with a history of depression and ADHD who presented to [facility] due to AMS. Psychiatry was consulted to address the patient's symptom of catatonia.    HPI per Primary Team:   Patient experienced an episode of catatonia in 2015 after the death of her mother, which led to a stint in an inpatient psychiatric facility. She was found altered and brought to the ED (nobody can provide collateral details of this, and she has had no family visit or return calls).  Since admit she will only blink and stare ahead.  At admission she was dehydrated with an elevated lactic acid and Na level.  This morning she became slightly more lucid and was alert x3, only to return to her previous catatonic state. At admission her WBC was normal, then merry to 18k, then returned to normal.  She had blood cxs done, which turned positive 1 of 2 with GPC (too early to know if chains or clusters), for which she was started on Vancomyin and Rocephin.  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  The sending MD was unable to perform a LP due to her being given Lovenox for DVT prophylaxis.    Patient is lying in bed with eyes open but does not respond to questions. Called father Kennedy Mcintyre- he states this happened 3 years ago and was hospitalized at [facility] for catatonia associated with depression. Required ECT which was successful. Became unresponsive  "again 6-7 days ago off and on. She had decreased PO intake. Father states that first episode was triggered by patient's mother's death. He thinks that paternal grandmother's death (3 years ago) and recent move into her grandmother's house due to snakes infesting her trailer (3 months ago). This move made patient depressed according to her father. He also thinks that break up with her boyfriend (3-4 years ago). may have triggered it. Patient says that she has been depressed for "a while" according to her father. She follows with Dr Arenas in [place] and receives meds from [facility] in [place] (not open currently).      After giving 1mg ativan x1, patient woke up. She was disoriented but then realized she was in [place]. She says she has had "bad depression" but could not elaborate further. She says she takes xanax PRN and no other psych meds. She complains of pain in the bad of her neck.     Psychiatry Consult:  HPI limited due to patient's current condition. Patient demonstrates mutism and stupor with no reactivity to painful stimuli. Patient has a blank stare and only blinks. Upon lifting her arm into the arm, her arm immediately drops down. Per patient's sitter, the patient has remained in the same position for the entire duration that the sitter has been with her (approximately 3 hours). Sitter states that when the patient has been moved to a different position, she will return back to her initial position. The sitter reports that the patient has grunted on a few occasions, but has not spoken at all. Unable to assess for mood, SI, HI, and AVH.    Per the Luther-Alvino Catatonia Rating Scale, the patient is positive for immobility/stupor (3 pts), mutism (3pts), staring (3 pts), and negativism (3pts), which is indicative of catatonia. The patient also failed CAM-ICU (demonstrated AMS, failed SAVEAHAART test for inattention, and demonstrated altered level of consciousness), which is indicative of " delirium.    Per [facility] Chart Review:  [facility] evaluated the patient for catatonia on 1/8/2016. The patient was initially started on Ativan 1mg IV TID and Amantadine 100mg NG TID which was ineffective, so consent was obtained from the patient's father for ECT. The patient returned to baseline after two ECT sessions and was started on Celexa 20mg daily for MDD.    Collateral: Kennedy Mcintyre (father) [phone]    Psychiatric Review of Systems-is patient experiencing or having changes in  sleep: unable to assess  appetite: unable to assess  weight: unable to assess  energy/anergy: unable to assess  interest/pleasure/anhedonia: unable to assess  somatic symptoms: unable to assess  libido: unable to assess  anxiety/panic: unable to assess  guilty/hopelessness: unable to assess  concentration: unable to assess  S.I.B.s/risky behavior: unable to assess  any drugs: unable to assess  alcohol: unable to assess     History obtained from chart review and [facility] records due to patient's current condition:     Past Psychiatric History:  Previous Medication Trials: Cymbalta, Celexa, Adderall, Xanax, Ambien   Previous Psychiatric Hospitalizations: no   Previous Suicide Attempts: no   History of Violence: no  Outpatient Psychiatrist: Dr. Cormier in [place] ([phone])    Social History:  Marital Status: not   Children: 0   Employment Status/Info: currently unemployed  Education: 10th grade  Special Ed: no  Housing Status: grandmother's house  History of phys/sexual abuse: no  Access to gun: no    Substance Abuse History:  Recreational Drugs: none  Use of Alcohol: denied  Rehab History:no   Tobacco Use:no  Use of OTC: unknown    Legal History:  Past Charges/Incarcerations:yes, she and boyfriend were arrested for marijuana possession around ~2011   Pending charges:no     Family Psychiatric History:   Brother with depression and ADHD    Psychosocial Stressors: recent move from [facility] to grandmother's  house  Functioning Relationships: good relationship with father    Hospital Course: 08/03/2018   Chart reviewed. Upon initiation of interview, pt was lying in bed, dressed in hospital gown. No distress noted. No acute events overnight. Patient continues to be nonverbal and not able to follow vocal commands.  When medical student evaluated the patient it was reported that she did have some eye tracking however this is not appreciated on my evaluation. For the entire duration of my evaluation she had no apparent response or interaction with her external environment.      Per chart review there have been periods of improvement of her catatonic symptoms as she has been able to make short vocalizations to nursing and other providers.    Upon staffing rounds later in the day patient was found to be much more responsive and able to interact with the environment. She reports that she stopped taking Latuda ~3 months ago and has been having more depression since.  She denies SI.  Denies symptoms consistent with prior manic/hypomanic episodes.  She is fully oriented.    Interval History: see hospital course    Family History     Problem Relation (Age of Onset)    Arthritis Father    Bipolar disorder Brother    Cancer Mother, Father, Paternal Grandmother    Depression Father    Heart disease Father        Social History Main Topics    Smoking status: Current Some Day Smoker     Packs/day: 0.25    Smokeless tobacco: Not on file    Alcohol use Yes      Comment: occasional    Drug use: No    Sexual activity: Not on file     Psychotherapeutics     Start     Stop Route Frequency Ordered    08/02/18 2100  lorazepam injection 1 mg      -- IV 4 times daily 08/02/18 2382           Review of Systems  Objective:     Vital Signs (Most Recent):  Temp: 99.1 °F (37.3 °C) (08/03/18 0800)  Pulse: 90 (08/03/18 0800)  Resp: 18 (08/03/18 0800)  BP: 119/78 (08/03/18 0800)  SpO2: 99 % (08/03/18 0800) Vital Signs (24h Range):  Temp:  [97.8 °F  "(36.6 °C)-99.1 °F (37.3 °C)] 99.1 °F (37.3 °C)  Pulse:  [78-92] 90  Resp:  [18-20] 18  SpO2:  [95 %-100 %] 99 %  BP: (119-139)/(68-78) 119/78     Height: 5' 4" (162.6 cm)  Weight: 68 kg (149 lb 14.6 oz)  Body mass index is 25.73 kg/m².    No intake or output data in the 24 hours ending 08/03/18 1006    Physical Exam    Mental Status Exam:  Appearance: unremarkable, appears stated age  Behavior/Cooperation: stuporous, blank stare  Speech: mute  Mood: unable to assess  Affect: unable to assess  Thought Process: unable to assess  Thought Content: unable to assess  Perception: unable to assess  Orientation: unable to assess  Memory: unable to assess  Attention Span/Concentration: unable to assess; failed CAM-ICU  Insight: unable to assess  Judgment: unable to assess     Significant Labs:   Last 24 Hours:   Recent Lab Results       08/03/18  0418 08/03/18  0100      Immature Granulocytes 1.5(H)      Immature Grans (Abs) 0.11  Comment:  Mild elevation in immature granulocytes is non specific and   can be seen in a variety of conditions including stress response,   acute inflammation, trauma and pregnancy. Correlation with other   laboratory and clinical findings is essential.  (H)      Anion Gap 8      Aniso Slight      Baso # 0.06      Basophil% 0.8      BUN, Bld 3(L)      Calcium 8.8      Chloride 109      CO2 24      Creatinine 0.5      Differential Method Automated      eGFR if African American >60.0      eGFR if non  >60.0  Comment:  Calculation used to obtain the estimated glomerular filtration  rate (eGFR) is the CKD-EPI equation.         Eos # 0.1      Eosinophil% 1.6      Glucose 100      Gran # (ANC) 4.1      Gran% 54.5      Hematocrit 35.8(L)      Hemoglobin 12.1      Hypo Occasional      Lymph # 2.4      Lymph% 31.9      MCH 31.8(H)      MCHC 33.8      MCV 94      Mono # 0.7      Mono% 9.7      MPV SEE COMMENT  Comment:  Result not available.      nRBC 0      Ovalocytes Occasional      " Platelets 155      POCT Glucose  118(H)     Poik Slight      Poly Occasional      Potassium 4.0      RBC 3.81(L)      RDW 13.8      Sodium 141      Tear Drop Cells Occasional      WBC 7.52            Significant Imaging: I have reviewed all pertinent imaging results/findings within the past 24 hours.    Assessment/Plan:     * Catatonia    - Per the Luther-Alvino Catatonia Rating Scale, the patient is positive for immobility/stupor (3 pts), mutism (3pts), staring (3 pts), and negativism (3pts), indicative of retarded catatonia  - Previous catatonic episode successfully treated with ECT at [facility] in January 2016  - Continue Ativan IV 1 mg every 6 hours.  If she develops worsening of her catatonia recommend increasing to 2 mg every six hours.  - If improvement is not sustained on Ativan, would try to obtain consent from patient's father for ECT as she has shown good response to this treatment in the past. ECT will require transfer to [facility]. Will discuss transferring patient with Dr. Maurice Astorga at [facility]. Literature review indicates no contraindications to ECT while on linezolid if transfer is required prior to completion of her abx course.   - Continue Lovenox DVT prophylaxis due to patient's current immobility/stupor  - Continue to monitor for dehydration and malnutrition by monitoring I/O, continuing IVF, and nutrition consult for tube feeds vs TPN/PPN  -         Major depressive disorder, severe    - Previous trials of Cymbalta and Celexa  - Consider addition of antidepressant after Zyvox treatment has been completed and coordinate with outpatient psychiatrist Dr. Cormier             Need for Continued Hospitalization:   Requires ongoing hospitalization for stabilization of medications.    Anticipated Disposition: Still a Patient     Total time:  25 with greater than 50% of this time spent in counseling and/or coordination of care.       Ponce Anand MD   Psychiatry  [facility]

## 2018-08-03 NOTE — PROGRESS NOTES
Hospital Medicine  Progress note    I personally performed the history, physical exam and medical decision making: and confirmed the accuracy of the information in the transcribed note.    Team: [facility] HOSP MED B Mohsen Astorga MD  Admit Date: 8/1/2018  KRISHNA 8/7/2018  Code status: Full Code    Principal Problem:  Catatonia    Interval hx: Patient still catatonic but stated her head hurt and she eats a couple of times. Discussion has started about transferring to [facility] for ECT therapy as this patient had before with same symptoms.      ROS   Unable to perform ROS: Mental status change       PEx  Temp:  [97.1 °F (36.2 °C)-99.1 °F (37.3 °C)]   Pulse:  [78-90]   Resp:  [18-20]   BP: (119-143)/(68-84)   SpO2:  [95 %-100 %]   No intake or output data in the 24 hours ending 08/03/18 1313     Constitutional: She appears well-developed and well-nourished. No distress. obese   Cardiovascular: Normal rate, regular rhythm, normal heart sounds and intact distal pulses.  Exam reveals no gallop and no friction rub.    No murmur heard.  Pulmonary/Chest: Effort normal and breath sounds normal. No respiratory distress. She has no wheezes. She has no rales. Room air   Abdominal: Soft. Bowel sounds are normal. She exhibits no distension and no mass. There is no tenderness.  Neurological: She is alert. She has normal reflexes. She displays no atrophy and no tremor. She exhibits normal muscle tone. She displays no seizure activity.   Does not respond to commands. Blinked to confrontation once, but then did not. Slightly molded her hand around mine on the R. No response with the L hand. No response to Babinski   Skin: Skin is warm and dry. No rash noted. She is not diaphoretic. No erythema. No pallor.   Nursing note and vitals reviewed.      Recent Labs  Lab 08/01/18 2219 08/02/18  0535 08/03/18  0418   WBC 12.11 10.60 7.52   HGB 13.2 12.7 12.1   HCT 39.5 37.7 35.8*    188 155       Recent Labs  Lab 08/01/18 2219 08/02/18  0535  08/03/18  0418    140 141   K 4.5 3.5 4.0    106 109   CO2 25 25 24   BUN 5* 6 3*   CREATININE 0.6 0.6 0.5    119* 100   CALCIUM 8.9 8.8 8.8   MG 2.3  --   --    PHOS 3.3  --   --        Recent Labs  Lab 08/01/18 2219 08/01/18  2220   ALKPHOS 52*  --    ALT 10  --    AST 24  --    ALBUMIN 3.2*  --    PROT 6.5  --    BILITOT 0.4  --    INR  --  1.4*        Recent Labs  Lab 08/01/18 2219 08/02/18  0736 08/03/18  0100 08/03/18  1212   POCTGLUCOSE 103 123* 118* 108     Recent Labs      08/01/18 2219   CPK  183*       Scheduled Meds:   enoxaparin  40 mg Subcutaneous Daily    lidocaine  1 patch Transdermal Q24H    linezolid 600mg/300ml  600 mg Intravenous Q12H    lorazepam  1 mg Intravenous QID     Continuous Infusions:   dextrose 5 % and 0.45 % NaCl with KCl 20 mEq 125 mL/hr at 08/03/18 0642     As Needed:  sodium chloride 0.9%    Active Hospital Problems    Diagnosis  POA    *Catatonia [F06.1]  Yes    Major depressive disorder, severe [F32.2]  Yes    Bacteremia [R78.81]  Yes    Coagulopathy [D68.9]  Yes      Resolved Hospital Problems    Diagnosis Date Resolved POA   No resolved problems to display.       Overview      Assessment and Plan for Problems addressed today:    Catatonia     - OSH workup including LP, MRI/MRA, CTH negative  - check HIV, RPR, TSH  - vEEG ordered  - not malignant- no fever, no rigidity, no autonomic instability  - start ativan 1mg TID. Patient woke up after receiving first dose. May need to increase or patient may need ECT again  - Psych and Neuro consults  - Nutrition consult for tube feeding/PPN/TPN recs since patient has not been eating for almost 1 week     Coagulopathy     INR 1.4  May be nutritional vitamin K deficiency given long NPO status  Vitamin K SQ x1             Bacteremia     - 1/2 cultures positive on admit to OSH for gram positive. Notes from OSH state thought to be E. Faecalis. TTE performed and negative there. Repeat blood cultures negative.  ?contaminant?  - repeat blood cultures x2  - continue linezolid for now- will need to call OSH for culture results to narrow               Discharge plan and follow up    Provider    I personally scribed for Mohsen Astorga MD on 08/03/2018 at 10:52 AM. Electronically signed by christian Anderson III on 08/03/2018 at 10:52 AM

## 2018-08-03 NOTE — PROGRESS NOTES
Pt states that she sometimes hears voices, in particular a family member's voice while she has been hospitalized. No family is present. She states that this happens when she is fully awake and talkative, and also when she is in her catatonic state.

## 2018-08-03 NOTE — HOSPITAL COURSE
"08/03/2018   Chart reviewed. Upon initiation of interview, pt was lying in bed, dressed in hospital gown. No distress noted. No acute events overnight. Patient continues to be nonverbal and not able to follow vocal commands.  When medical student evaluated the patient it was reported that she did have some eye tracking however this is not appreciated on my evaluation. For the entire duration of my evaluation she had no apparent response or interaction with her external environment.      Per chart review there have been periods of improvement of her catatonic symptoms as she has been able to make short vocalizations to nursing and other providers.    08/04/2018  Upon my evaluation patient is lying in bed talking coherently to her brother on the telephone. She acknowledges me as "the doctor" as I enter the room and asks her brother if she can call back. Reports that she is in the hospital for Catatonia and accurately describes the symptoms. She is linear, alert and oriented x 4 with a blunted affect. She accurately describes the television show she is watching. Does not have much of an appetite. Denies SI/HI/AVH    8/6/2018  Chart reviewed. Upon initiation of interview, pt was lying in bed, dressed in hospital gown. No distress noted, patient agreeable and cooperative with interview. IV access was lost. Patient states that she is feeling "so-so" this morning. Patient reports sleeping well, and has a good appetite. No somatic complaints. Tolerating medications without adverse side effects. Denies SI, HI, AVH, delusions, or paranoia. Patient has been compliant with medications. No psychiatric PRNs required. She continues to report that her depression is present, but is not able to really describe symptoms.  Rates it as a 6/10 in overall severity.  She reports that she feels that her catatonic symptoms which she has good insight into have continued to wax and wane with periods of time between Ativan dosing that she has " "reoccurrences of her symptoms.  Discussed with her possible plans of transferring to [facility] for ECT treatments as she has responded well to these treatments in the past.    08/07/2018  Chart reviewed. Upon initiation of interview, pt was lying in bed, dressed in hospital gown. No distress noted, patient agreeable and cooperative with interview. No acute events overnight. Patient states she is feeling "about the same" this morning. Patient reports sleeping well, and has a good appetite. No somatic complaints. Tolerating medications without adverse side effects. Denies SI, HI, AVH, delusions, or paranoia. Patient has been compliant with medications. No psychiatric PRNs required.   She reports that she is continuing to notice that she is not yet back to her baseline.  Appetite had been significantly decrease, but per nursing patient has eaten 100% of the last two meals.  However discussed with patient that she is still not having much limb movement, decreased eye scanning, and limited speech thus ECT is indicated in her case. Discussed plan of proceeding with transfer to [facility] and obtaining ECT treatments.  Patient is agreeable to do so at this time.       08/08/2018  Chart reviewed. Upon initiation of interview, pt was lying in bed, dressed in hospital gown. No distress noted, patient agreeable and cooperative with interview. No acute events overnight. Patient states she is feeling "ok" this morning. Patient reports sleeping well, and has a good appetite. No somatic complaints. Tolerating medications without adverse side effects. Reports depression is stable. Denies SI, HI, AVH, delusions, or paranoia. Patient has been compliant with medications. No psychiatric PRNs required.  She is continuing to have waxing and waning symptoms of catatonia, mainly in psychomotor slowing.  Eye movement is slightly improved.  Still agreeable to transfer to [facility] for ECT.      "

## 2018-08-03 NOTE — MEDICAL/APP STUDENT
"[facility]  Psychiatry  Progress Note     Patient Name: Rupal Mcintyre  MRN: [id number]   Code Status: Full Code  Admission Date: 8/1/2018  Hospital Length of Stay: 2 days  Attending Physician: Mohsen Astorga MD  Primary Care Provider: Primary Doctor No     Current Legal Status: N/A     Patient information was obtained from patient, past medical records and ER records.    SUBJECTIVE     History of Present Illness:   Rupal Mcintyre is a 28 y.o. female with past psychiatric history of ADHD, depression, and catatonia in 2015 treated with ECT who presented to the [facility] ED as a transfer from [facility]. Psychiatry was originally consulted to address the patient's symptoms of catatonia and decreased oral intake.       During the initial psychiatry consult, as per the Luther-Alvino Catatonia Rating Scale, the patient was initially positive for immobility/stupor (3 pts), mutism (3pts), staring (3 pts), and negativism (3pts), which is indicative of catatonia. The patient also failed CAM-ICU (demonstrated AMS, failed SAVEAHAART test for inattention, and demonstrated altered level of consciousness), which is indicative of delirium.    Interim History:  Overnight, the pt was observed in catatonic state with intermittent alertness and orientation to self. VSS. On assessment of pain by nurse, pt stated, "My head hurts a little," and exhibited excessive salivation. Pt had several unmeasured incontinent episodes.     This morning on exam, pt was awake and felt hot to touch. Evidence of another incontinent episode noted by wet sheets. She slowly moved her mouth open/closed when asked if she was in [place], and if she was in the hospital. She also made a noise when asked "Can you tell me your last name." She exhibited excessive salivation.    She exhibited increased blinking compared to yesterday. PERRLA, lacrimation present. Was able to move eyes R once.  reflex detectable R>L, significantly " slowed. Continues to have waxing flexibility of upper extremities. UL and LL extremity reflexes 2+. Babinski equivocal bilaterally.     On repeat exam, pt was awake but unable to answer questions and did not display eye or mouth movement.     Per the Luther-Alvino Catatonia Rating Scale, the patient is positive for waxy flexibility (3 pts), mutism (3pts), immobility (3 pts), and staring (3 pts), which is indicative of catatonia (12 points).       Psychiatric Review Of Systems - Is patient experiencing or having changes in:  sleep: unable to access  Appetite: unable to access  weight: unable to access  energy/anergy: unable to access  interest/pleasure/anhedonia: unable to access  somatic symptoms: unable to access  guilty/hopelessness: unable to access  concentration: unable to access  S.I.B.s/risky behavior: unable to access  SI/SA: unable to access    anxiety/panic: unable to access  Agoraphobia:  unable to access  Social phobia:  unable to access  Recurrent nightmares:  unable to access  hyper startle response:  unable to access  Avoidance: unable to access  Recurrent thoughts:  unable to access  Recurrent behaviors:  unable to access    Irritability: unable to access  Racing thoughts: unable to access  Impulsive behaviors: unable to access  Pressured speech:  unable to access    Paranoia:unable to access  Delusions: unable to access  AVH: unable to access    Allergies:  Vancomycin analogues    Past Medical/Surgical History:  Past Medical History:   Diagnosis Date    Catatonia 2015    Depression with prior catatonia    Hypertension     Miscarriage 2012      has a past medical history of Catatonia (2015); Depression (with prior catatonia); Hypertension; and Miscarriage (2012).  History reviewed. No pertinent surgical history.    Past Psychiatric History:  Previous Medication Trials:Cymbalta, Celexa, Adderall, Xanax, Ambien   Previous Psychiatric Hospitalizations: no  Previous Suicide Attempts: no  History of  Violence: no  Outpatient Psychiatrist: Dr. Cormier in [place] ([phone])    Social History:  Marital Status: not    Children: 0  Employment Status/Info: currently unemployed  Education: 10th grade  Special Ed: no  Housing Status: grandmother's house  History of phys/sexual abuse: no  Access to gun: no    Substance Abuse History:  Recreational Drugs: none  Use of Alcohol: denied  Rehab History: no  Tobacco Use:no  Use of Caffeine: unknown  Use of OTC: unkown      Legal History:  Past Charges/Incarcerations: yes, she and boyfriend were arrested for marijuana possession around 2011  Pending charges: no    Family Psychiatric History:   Brother with depression and ADHD    Psychosocial Stressors: recent move from [facility] to grandmother's house  Functioning Relationships: good relationship with father    Scheduled Meds:   enoxaparin  40 mg Subcutaneous Daily    lidocaine  1 patch Transdermal Q24H    linezolid 600mg/300ml  600 mg Intravenous Q12H    lorazepam  1 mg Intravenous QID     sodium chloride 0.9%  Psychotherapeutics     Start     Stop Route Frequency Ordered    08/02/18 2100  lorazepam injection 1 mg      -- IV 4 times daily 08/02/18 1753          PRN Meds:  sodium chloride 0.9%    Home Meds:  Prior to Admission medications    Medication Sig Start Date End Date Taking? Authorizing Provider   ALPRAZolam (XANAX) 1 MG tablet Take 1 mg by mouth 3 (three) times daily.   Yes Historical Provider, MD   zolpidem (AMBIEN) 10 mg Tab Take 5 mg by mouth every evening.   Yes Historical Provider, MD       OBJECTIVE     Vital Signs:  Temp:  [97.8 °F (36.6 °C)-98.6 °F (37 °C)]   Pulse:  [78-92]   Resp:  [18-20]   BP: (119-139)/(68-76)   SpO2:  [95 %-100 %]       Mental Status Exam:  Appearance: unremarkable, age appropriate, lying in bed, overweight  Level of Consciousness: awake, stuporous, blank stare  Speech: mute  Orientation: place  Attention Span/Concentration: unable to access  Memory: unable to  access  Mood: unable to access  Affect: unable to access  Thought Process: unable to access  Associations: unable to access  Thought Content: unable to access  Fund of Knowledge: unable to access  Insight: unable to access  Judgment: unable to access    Laboratory Data:  Recent Results (from the past 48 hour(s))   TSH    Collection Time: 08/01/18 10:19 PM   Result Value Ref Range    TSH 2.559 0.400 - 4.000 uIU/mL   Blood culture    Collection Time: 08/01/18 10:19 PM   Result Value Ref Range    Blood Culture, Routine No Growth to date     Blood Culture, Routine No Growth to date    Comprehensive metabolic panel    Collection Time: 08/01/18 10:19 PM   Result Value Ref Range    Sodium 140 136 - 145 mmol/L    Potassium 4.5 3.5 - 5.1 mmol/L    Chloride 106 95 - 110 mmol/L    CO2 25 23 - 29 mmol/L    Glucose 110 70 - 110 mg/dL    BUN, Bld 5 (L) 6 - 20 mg/dL    Creatinine 0.6 0.5 - 1.4 mg/dL    Calcium 8.9 8.7 - 10.5 mg/dL    Total Protein 6.5 6.0 - 8.4 g/dL    Albumin 3.2 (L) 3.5 - 5.2 g/dL    Total Bilirubin 0.4 0.1 - 1.0 mg/dL    Alkaline Phosphatase 52 (L) 55 - 135 U/L    AST 24 10 - 40 U/L    ALT 10 10 - 44 U/L    Anion Gap 9 8 - 16 mmol/L    eGFR if African American >60.0 >60 mL/min/1.73 m^2    eGFR if non African American >60.0 >60 mL/min/1.73 m^2   Magnesium    Collection Time: 08/01/18 10:19 PM   Result Value Ref Range    Magnesium 2.3 1.6 - 2.6 mg/dL   Phosphorus    Collection Time: 08/01/18 10:19 PM   Result Value Ref Range    Phosphorus 3.3 2.7 - 4.5 mg/dL   CBC auto differential    Collection Time: 08/01/18 10:19 PM   Result Value Ref Range    WBC 12.11 3.90 - 12.70 K/uL    RBC 4.17 4.00 - 5.40 M/uL    Hemoglobin 13.2 12.0 - 16.0 g/dL    Hematocrit 39.5 37.0 - 48.5 %    MCV 95 82 - 98 fL    MCH 31.7 (H) 27.0 - 31.0 pg    MCHC 33.4 32.0 - 36.0 g/dL    RDW 13.9 11.5 - 14.5 %    Platelets 200 150 - 350 K/uL    MPV 11.8 9.2 - 12.9 fL    Immature Granulocytes 2.2 (H) 0.0 - 0.5 %    Gran # (ANC) 7.7 1.8 - 7.7 K/uL     Immature Grans (Abs) 0.27 (H) 0.00 - 0.04 K/uL    Lymph # 3.1 1.0 - 4.8 K/uL    Mono # 1.1 (H) 0.3 - 1.0 K/uL    Eos # 0.0 0.0 - 0.5 K/uL    Baso # 0.03 0.00 - 0.20 K/uL    nRBC 0 0 /100 WBC    Gran% 63.4 38.0 - 73.0 %    Lymph% 25.2 18.0 - 48.0 %    Mono% 8.8 4.0 - 15.0 %    Eosinophil% 0.2 0.0 - 8.0 %    Basophil% 0.2 0.0 - 1.9 %    Differential Method Automated    RPR    Collection Time: 08/01/18 10:19 PM   Result Value Ref Range    RPR Non-reactive Non-reactive   Rapid HIV    Collection Time: 08/01/18 10:19 PM   Result Value Ref Range    HIV Rapid Testing Negative Negative   Sedimentation rate    Collection Time: 08/01/18 10:19 PM   Result Value Ref Range    Sed Rate 10 0 - 20 mm/Hr   C-reactive protein    Collection Time: 08/01/18 10:19 PM   Result Value Ref Range    CRP 3.8 0.0 - 8.2 mg/L   TSH    Collection Time: 08/01/18 10:19 PM   Result Value Ref Range    TSH 2.559 0.400 - 4.000 uIU/mL   CK    Collection Time: 08/01/18 10:19 PM   Result Value Ref Range     (H) 20 - 180 U/L   POCT glucose    Collection Time: 08/01/18 10:19 PM   Result Value Ref Range    POCT Glucose 103 70 - 110 mg/dL   Protime-INR    Collection Time: 08/01/18 10:20 PM   Result Value Ref Range    Prothrombin Time 14.0 (H) 9.0 - 12.5 sec    INR 1.4 (H) 0.8 - 1.2   Blood culture    Collection Time: 08/01/18 10:20 PM   Result Value Ref Range    Blood Culture, Routine No Growth to date     Blood Culture, Routine No Growth to date    Vitamin B12    Collection Time: 08/01/18 10:20 PM   Result Value Ref Range    Vitamin B-12 1047 (H) 210 - 950 pg/mL   Ammonia    Collection Time: 08/01/18 10:20 PM   Result Value Ref Range    Ammonia 48 10 - 50 umol/L   Lactic acid, plasma    Collection Time: 08/01/18 10:20 PM   Result Value Ref Range    Lactate (Lactic Acid) 1.2 0.5 - 2.2 mmol/L   CBC auto differential    Collection Time: 08/02/18  5:35 AM   Result Value Ref Range    WBC 10.60 3.90 - 12.70 K/uL    RBC 4.01 4.00 - 5.40 M/uL    Hemoglobin  12.7 12.0 - 16.0 g/dL    Hematocrit 37.7 37.0 - 48.5 %    MCV 94 82 - 98 fL    MCH 31.7 (H) 27.0 - 31.0 pg    MCHC 33.7 32.0 - 36.0 g/dL    RDW 13.8 11.5 - 14.5 %    Platelets 188 150 - 350 K/uL    MPV 12.0 9.2 - 12.9 fL    Immature Granulocytes 0.7 (H) 0.0 - 0.5 %    Gran # (ANC) 7.6 1.8 - 7.7 K/uL    Immature Grans (Abs) 0.07 (H) 0.00 - 0.04 K/uL    Lymph # 2.0 1.0 - 4.8 K/uL    Mono # 0.9 0.3 - 1.0 K/uL    Eos # 0.1 0.0 - 0.5 K/uL    Baso # 0.03 0.00 - 0.20 K/uL    nRBC 0 0 /100 WBC    Gran% 71.2 38.0 - 73.0 %    Lymph% 18.4 18.0 - 48.0 %    Mono% 8.8 4.0 - 15.0 %    Eosinophil% 0.6 0.0 - 8.0 %    Basophil% 0.3 0.0 - 1.9 %    Differential Method Automated    Basic metabolic panel    Collection Time: 08/02/18  5:35 AM   Result Value Ref Range    Sodium 140 136 - 145 mmol/L    Potassium 3.5 3.5 - 5.1 mmol/L    Chloride 106 95 - 110 mmol/L    CO2 25 23 - 29 mmol/L    Glucose 119 (H) 70 - 110 mg/dL    BUN, Bld 6 6 - 20 mg/dL    Creatinine 0.6 0.5 - 1.4 mg/dL    Calcium 8.8 8.7 - 10.5 mg/dL    Anion Gap 9 8 - 16 mmol/L    eGFR if African American >60.0 >60 mL/min/1.73 m^2    eGFR if non African American >60.0 >60 mL/min/1.73 m^2   POCT glucose    Collection Time: 08/02/18  7:36 AM   Result Value Ref Range    POCT Glucose 123 (H) 70 - 110 mg/dL   POCT glucose    Collection Time: 08/03/18  1:00 AM   Result Value Ref Range    POCT Glucose 118 (H) 70 - 110 mg/dL   CBC auto differential    Collection Time: 08/03/18  4:18 AM   Result Value Ref Range    WBC 7.52 3.90 - 12.70 K/uL    RBC 3.81 (L) 4.00 - 5.40 M/uL    Hemoglobin 12.1 12.0 - 16.0 g/dL    Hematocrit 35.8 (L) 37.0 - 48.5 %    MCV 94 82 - 98 fL    MCH 31.8 (H) 27.0 - 31.0 pg    MCHC 33.8 32.0 - 36.0 g/dL    RDW 13.8 11.5 - 14.5 %    Platelets 155 150 - 350 K/uL    MPV SEE COMMENT 9.2 - 12.9 fL    Immature Granulocytes 1.5 (H) 0.0 - 0.5 %    Gran # (ANC) 4.1 1.8 - 7.7 K/uL    Immature Grans (Abs) 0.11 (H) 0.00 - 0.04 K/uL    Lymph # 2.4 1.0 - 4.8 K/uL    Mono # 0.7  0.3 - 1.0 K/uL    Eos # 0.1 0.0 - 0.5 K/uL    Baso # 0.06 0.00 - 0.20 K/uL    nRBC 0 0 /100 WBC    Gran% 54.5 38.0 - 73.0 %    Lymph% 31.9 18.0 - 48.0 %    Mono% 9.7 4.0 - 15.0 %    Eosinophil% 1.6 0.0 - 8.0 %    Basophil% 0.8 0.0 - 1.9 %    Aniso Slight     Poik Slight     Poly Occasional     Hypo Occasional     Ovalocytes Occasional     Tear Drop Cells Occasional     Differential Method Automated    Basic metabolic panel    Collection Time: 08/03/18  4:18 AM   Result Value Ref Range    Sodium 141 136 - 145 mmol/L    Potassium 4.0 3.5 - 5.1 mmol/L    Chloride 109 95 - 110 mmol/L    CO2 24 23 - 29 mmol/L    Glucose 100 70 - 110 mg/dL    BUN, Bld 3 (L) 6 - 20 mg/dL    Creatinine 0.5 0.5 - 1.4 mg/dL    Calcium 8.8 8.7 - 10.5 mg/dL    Anion Gap 8 8 - 16 mmol/L    eGFR if African American >60.0 >60 mL/min/1.73 m^2    eGFR if non African American >60.0 >60 mL/min/1.73 m^2      No results found for: PHENYTOIN, PHENOBARB, VALPROATE, CBMZ  Imaging:  Imaging Results    None          ASSESSMENT     Impression:  Rupal Mcintyre is a 28 y.o. female with a history of ADHD, depression, and catatonia currently on hospital day 2 for catatonic episode which started 7 days ago. She is clinically stable with brief interval of improvement overnight where she was alert, oriented to self, and able to speak to a nurse, which was no longer observed this morning on exam.      RECOMMENDATIONS      Major depressive disorder, severe       - Consider addition of antidepressant after Zyvox treatment has been completed and coordinate with outpatient psychiatrist Dr. Cormier          Catatonia     - Per the Luther-Alvino Catatonia Rating Scale, the patient is positive for retarded catatonia  - Increase Ativan IV to 2 mg PO every 6 hours.  - If no improvement with Ativan, would try to obtain consent from patient's father for ECT  - Continue Lovenox DVT prophylaxis due to patient's current immobility/stupor  - Continue to monitor for dehydration  and malnutrition by monitoring I/O, continuing IVF, and nutrition consult for tube feeds vs TPN/PPN       Sherry Martinez  Psychiatry   MS3 [facility]- [facility]

## 2018-08-03 NOTE — PROGRESS NOTES
Marked improvement in catatonia s/s a few minutes after administration of Ativan IVP. Pt becomes alert, and talkative, oriented X 4 this afternoon.

## 2018-08-03 NOTE — MEDICAL/APP STUDENT
Hospital Medicine  Progress note    Team: [facility] Sergey Anderson  Admit Date: 8/1/2018  KRISHNA 8/7/2018  Code status: Full Code    Principal Problem:  Catatonia    Interval hx: Patient still catatonic but stated her head hurt and she eats a couple of times. Discussion has started about transferring to [facility] for ECT therapy as this patient had before with same symptoms.      ROS   Unable to perform ROS: Mental status change       PEx  Temp:  [97.8 °F (36.6 °C)-99.1 °F (37.3 °C)]   Pulse:  [78-92]   Resp:  [18-20]   BP: (119-139)/(68-78)   SpO2:  [95 %-100 %]   No intake or output data in the 24 hours ending 08/03/18 1050     Constitutional: She appears well-developed and well-nourished. No distress. obese   Cardiovascular: Normal rate, regular rhythm, normal heart sounds and intact distal pulses.  Exam reveals no gallop and no friction rub.    No murmur heard.  Pulmonary/Chest: Effort normal and breath sounds normal. No respiratory distress. She has no wheezes. She has no rales. Room air   Abdominal: Soft. Bowel sounds are normal. She exhibits no distension and no mass. There is no tenderness.  Neurological: She is alert. She has normal reflexes. She displays no atrophy and no tremor. She exhibits normal muscle tone. She displays no seizure activity.   Does not respond to commands. Blinked to confrontation once, but then did not. Slightly molded her hand around mine on the R. No response with the L hand. No response to Babinski   Skin: Skin is warm and dry. No rash noted. She is not diaphoretic. No erythema. No pallor.   Nursing note and vitals reviewed.      Recent Labs  Lab 08/01/18 2219 08/02/18  0535 08/03/18  0418   WBC 12.11 10.60 7.52   HGB 13.2 12.7 12.1   HCT 39.5 37.7 35.8*    188 155       Recent Labs  Lab 08/01/18 2219 08/02/18  0535 08/03/18  0418    140 141   K 4.5 3.5 4.0    106 109   CO2 25 25 24   BUN 5* 6 3*   CREATININE 0.6 0.6 0.5    119* 100   CALCIUM 8.9 8.8 8.8   MG  2.3  --   --    PHOS 3.3  --   --        Recent Labs  Lab 08/01/18 2219 08/01/18  2220   ALKPHOS 52*  --    ALT 10  --    AST 24  --    ALBUMIN 3.2*  --    PROT 6.5  --    BILITOT 0.4  --    INR  --  1.4*        Recent Labs  Lab 08/01/18 2219 08/02/18  0736 08/03/18  0100   POCTGLUCOSE 103 123* 118*     Recent Labs      08/01/18   2219   CPK  183*       Scheduled Meds:   enoxaparin  40 mg Subcutaneous Daily    lidocaine  1 patch Transdermal Q24H    linezolid 600mg/300ml  600 mg Intravenous Q12H    lorazepam  1 mg Intravenous QID     Continuous Infusions:   dextrose 5 % and 0.45 % NaCl with KCl 20 mEq 125 mL/hr at 08/03/18 0642     As Needed:  sodium chloride 0.9%    Active Hospital Problems    Diagnosis  POA    *Catatonia [F06.1]  Yes    Major depressive disorder, severe [F32.2]  Yes    Bacteremia [R78.81]  Yes    Coagulopathy [D68.9]  Yes      Resolved Hospital Problems    Diagnosis Date Resolved POA   No resolved problems to display.       Overview      Assessment and Plan for Problems addressed today:    Catatonia     - OSH workup including LP, MRI/MRA, CTH negative  - check HIV, RPR, TSH  - vEEG ordered  - not malignant- no fever, no rigidity, no autonomic instability  - start ativan 1mg TID. Patient woke up after receiving first dose. May need to increase or patient may need ECT again  - Psych and Neuro consults  - Nutrition consult for tube feeding/PPN/TPN recs since patient has not been eating for almost 1 week     Coagulopathy     INR 1.4  May be nutritional vitamin K deficiency given long NPO status  Vitamin K SQ x1             Bacteremia     - 1/2 cultures positive on admit to OSH for gram positive. Notes from OSH state thought to be E. Faecalis. TTE performed and negative there. Repeat blood cultures negative. ?contaminant?  - repeat blood cultures x2  - continue linezolid for now- will need to call OSH for culture results to narrow               Discharge plan and follow up    Provider    I  personally scribed for Mohsen Astorga MD on 08/03/2018 at 10:52 AM. Electronically signed by christian Anderson III on 08/03/2018 at 10:52 AM

## 2018-08-03 NOTE — PLAN OF CARE
Problem: Fall Risk (Adult)  Intervention: Safety Precautions  Patient was given instructions to not get up from bed without calling for assistance first. Patient verbalizes understanding and is calling staff to get up before doing so independently. Will continue to closely monitor. Patient in chair, chair alarm set, call bell in reach and patient is within comfort measures

## 2018-08-03 NOTE — PROGRESS NOTES
Patient has been completely mute and non verbal to commands until Ativan IVP was given at 1045. 15 minutes after the administration (approx 1100)  Ativan IVP, patient is responsive and follow all commands appropriately.

## 2018-08-04 LAB
ANION GAP SERPL CALC-SCNC: 7 MMOL/L
BASOPHILS # BLD AUTO: 0.03 K/UL
BASOPHILS NFR BLD: 0.3 %
BUN SERPL-MCNC: 2 MG/DL
CALCIUM SERPL-MCNC: 9.1 MG/DL
CHLORIDE SERPL-SCNC: 109 MMOL/L
CO2 SERPL-SCNC: 24 MMOL/L
CREAT SERPL-MCNC: 0.5 MG/DL
DIFFERENTIAL METHOD: ABNORMAL
EOSINOPHIL # BLD AUTO: 0.2 K/UL
EOSINOPHIL NFR BLD: 1.6 %
ERYTHROCYTE [DISTWIDTH] IN BLOOD BY AUTOMATED COUNT: 13.5 %
EST. GFR  (AFRICAN AMERICAN): >60 ML/MIN/1.73 M^2
EST. GFR  (NON AFRICAN AMERICAN): >60 ML/MIN/1.73 M^2
GLUCOSE SERPL-MCNC: 98 MG/DL
HCT VFR BLD AUTO: 39.2 %
HGB BLD-MCNC: 13 G/DL
IMM GRANULOCYTES # BLD AUTO: 0.03 K/UL
IMM GRANULOCYTES NFR BLD AUTO: 0.3 %
LYMPHOCYTES # BLD AUTO: 2.8 K/UL
LYMPHOCYTES NFR BLD: 28.7 %
MCH RBC QN AUTO: 31.9 PG
MCHC RBC AUTO-ENTMCNC: 33.2 G/DL
MCV RBC AUTO: 96 FL
MONOCYTES # BLD AUTO: 0.8 K/UL
MONOCYTES NFR BLD: 7.9 %
NEUTROPHILS # BLD AUTO: 6 K/UL
NEUTROPHILS NFR BLD: 61.2 %
NRBC BLD-RTO: 0 /100 WBC
PLATELET # BLD AUTO: 171 K/UL
PMV BLD AUTO: 11.4 FL
POCT GLUCOSE: 80 MG/DL (ref 70–110)
POCT GLUCOSE: 80 MG/DL (ref 70–110)
POCT GLUCOSE: 85 MG/DL (ref 70–110)
POTASSIUM SERPL-SCNC: 4.1 MMOL/L
RBC # BLD AUTO: 4.07 M/UL
SODIUM SERPL-SCNC: 140 MMOL/L
WBC # BLD AUTO: 9.75 K/UL

## 2018-08-04 PROCEDURE — 36415 COLL VENOUS BLD VENIPUNCTURE: CPT

## 2018-08-04 PROCEDURE — 94761 N-INVAS EAR/PLS OXIMETRY MLT: CPT

## 2018-08-04 PROCEDURE — 80048 BASIC METABOLIC PNL TOTAL CA: CPT

## 2018-08-04 PROCEDURE — 99232 SBSQ HOSP IP/OBS MODERATE 35: CPT | Mod: ,,, | Performed by: INTERNAL MEDICINE

## 2018-08-04 PROCEDURE — 63600175 PHARM REV CODE 636 W HCPCS: Performed by: HOSPITALIST

## 2018-08-04 PROCEDURE — 63600175 PHARM REV CODE 636 W HCPCS: Performed by: PSYCHIATRY & NEUROLOGY

## 2018-08-04 PROCEDURE — 85025 COMPLETE CBC W/AUTO DIFF WBC: CPT

## 2018-08-04 PROCEDURE — 20600001 HC STEP DOWN PRIVATE ROOM

## 2018-08-04 PROCEDURE — 25000003 PHARM REV CODE 250: Performed by: HOSPITALIST

## 2018-08-04 RX ADMIN — LORAZEPAM 1 MG: 2 INJECTION INTRAMUSCULAR; INTRAVENOUS at 03:08

## 2018-08-04 RX ADMIN — ENOXAPARIN SODIUM 40 MG: 100 INJECTION SUBCUTANEOUS at 05:08

## 2018-08-04 RX ADMIN — LIDOCAINE 1 PATCH: 50 PATCH TOPICAL at 09:08

## 2018-08-04 RX ADMIN — LORAZEPAM 1 MG: 2 INJECTION INTRAMUSCULAR; INTRAVENOUS at 09:08

## 2018-08-04 RX ADMIN — DEXTROSE MONOHYDRATE, SODIUM CHLORIDE, AND POTASSIUM CHLORIDE: 50; 4.5; 1.49 INJECTION, SOLUTION INTRAVENOUS at 11:08

## 2018-08-04 RX ADMIN — LORAZEPAM 1 MG: 2 INJECTION INTRAMUSCULAR; INTRAVENOUS at 10:08

## 2018-08-04 RX ADMIN — LINEZOLID 600 MG: 600 INJECTION, SOLUTION INTRAVENOUS at 01:08

## 2018-08-04 RX ADMIN — LINEZOLID 600 MG: 600 INJECTION, SOLUTION INTRAVENOUS at 11:08

## 2018-08-04 NOTE — PROGRESS NOTES
Hospital Medicine  Progress note    I personally performed the history, physical exam and medical decision making: and confirmed the accuracy of the information in the transcribed note.    Team: [facility] HOSP MED B Mohsen Astorga MD  Admit Date: 8/1/2018  KRISHNA 8/7/2018  Code status: Full Code    Principal Problem:  Catatonia    Interval hx: Patient was up and walking around the room yesterday per nursing. Back to catatonic state this morning. Continue with current regime as it appears to be working but will need to consider ECT if no improvement.     ROS   Unable to perform ROS: Mental status change       PEx  Temp:  [96.2 °F (35.7 °C)-98.8 °F (37.1 °C)]   Pulse:  []   Resp:  [17-19]   BP: (108-160)/(58-87)   SpO2:  [96 %-99 %]     Intake/Output Summary (Last 24 hours) at 08/04/18 1707  Last data filed at 08/04/18 0600   Gross per 24 hour   Intake                0 ml   Output              600 ml   Net             -600 ml        Constitutional: She appears well-developed and well-nourished. No distress. obese   Cardiovascular: Normal rate, regular rhythm, normal heart sounds and intact distal pulses.  Exam reveals no gallop and no friction rub.    No murmur heard.  Pulmonary/Chest: Effort normal and breath sounds normal. No respiratory distress. She has no wheezes. She has no rales. Room air   Abdominal: Soft. Bowel sounds are normal. She exhibits no distension and no mass. There is no tenderness.  Neurological: She is alert. She has normal reflexes. She displays no atrophy and no tremor. She exhibits normal muscle tone. She displays no seizure activity.   Does not respond to commands. Blinked to confrontation once, but then did not. Slightly molded her hand around mine on the R. No response with the L hand. No response to Babinski   Skin: Skin is warm and dry. No rash noted. She is not diaphoretic. No erythema. No pallor.   Nursing note and vitals reviewed.      Recent Labs  Lab 08/02/18  0535 08/03/18  3931  08/04/18  0517   WBC 10.60 7.52 9.75   HGB 12.7 12.1 13.0   HCT 37.7 35.8* 39.2    155 171       Recent Labs  Lab 08/01/18  2219 08/02/18  0535 08/03/18  0418 08/04/18  0517    140 141 140   K 4.5 3.5 4.0 4.1    106 109 109   CO2 25 25 24 24   BUN 5* 6 3* 2*   CREATININE 0.6 0.6 0.5 0.5    119* 100 98   CALCIUM 8.9 8.8 8.8 9.1   MG 2.3  --   --   --    PHOS 3.3  --   --   --        Recent Labs  Lab 08/01/18  2219 08/01/18  2220   ALKPHOS 52*  --    ALT 10  --    AST 24  --    ALBUMIN 3.2*  --    PROT 6.5  --    BILITOT 0.4  --    INR  --  1.4*        Recent Labs  Lab 08/02/18  0736 08/03/18  0100 08/03/18  0651 08/03/18  1212 08/04/18  0002 08/04/18  1204   POCTGLUCOSE 123* 118* 108 108 80 80     Recent Labs      08/01/18 2219   CPK  183*       Scheduled Meds:   enoxaparin  40 mg Subcutaneous Daily    lidocaine  1 patch Transdermal Q24H    linezolid 600mg/300ml  600 mg Intravenous Q12H    lorazepam  1 mg Intravenous QID     Continuous Infusions:   dextrose 5 % and 0.45 % NaCl with KCl 20 mEq 125 mL/hr at 08/03/18 2153     As Needed:  sodium chloride 0.9%    Active Hospital Problems    Diagnosis  POA    *Catatonia [F06.1]  Yes    Major depressive disorder, severe [F32.2]  Yes    Bacteremia [R78.81]  Yes    Coagulopathy [D68.9]  Yes      Resolved Hospital Problems    Diagnosis Date Resolved POA   No resolved problems to display.       Overview      Assessment and Plan for Problems addressed today:    Catatonia     - OSH workup including LP, MRI/MRA, CTH negative  - check HIV, RPR, TSH  - vEEG ordered  - not malignant- no fever, no rigidity, no autonomic instability  - start ativan 1mg TID. Patient woke up after receiving first dose. May need to increase or patient may need ECT again  - Psych and Neuro consults  - Nutrition consult for tube feeding/PPN/TPN recs since patient has not been eating for almost 1 week     Coagulopathy     INR 1.4  May be nutritional vitamin K deficiency  given long NPO status  Vitamin K SQ x1             Bacteremia     - 1/2 cultures positive on admit to OSH for gram positive. Notes from OSH state thought to be E. Faecalis. TTE performed and negative there. Repeat blood cultures negative. ?contaminant?  - repeat blood cultures x2  - continue linezolid for now- will need to call OSH for culture results to narrow               Discharge plan and follow up    Provider    I personally scribed for Mohsen Astorga MD on 08/04/2018 at 9:57 AM. Electronically signed by christian Anderson III on 08/04/2018 at 9:57 AM

## 2018-08-04 NOTE — MEDICAL/APP STUDENT
Hospital Medicine  Progress note    Team: [facility] [place] [facility]OSP MED B Sergey Anedrson  Admit Date: 8/1/2018  KRISHNA 8/7/2018  Code status: Full Code    Principal Problem:  Catatonia    Interval hx: Patient was up and walking around the room yesterday per nursing. Back to catatonic state this morning. Continue with current regime as it appears to be working but will need to consider ECT if no improvement.     ROS   Unable to perform ROS: Mental status change       PEx  Temp:  [97.1 °F (36.2 °C)-98.8 °F (37.1 °C)]   Pulse:  []   Resp:  [17-19]   BP: (108-143)/(73-84)   SpO2:  [96 %-99 %]     Intake/Output Summary (Last 24 hours) at 08/04/18 0955  Last data filed at 08/04/18 0600   Gross per 24 hour   Intake                0 ml   Output              600 ml   Net             -600 ml        Constitutional: She appears well-developed and well-nourished. No distress. obese   Cardiovascular: Normal rate, regular rhythm, normal heart sounds and intact distal pulses.  Exam reveals no gallop and no friction rub.    No murmur heard.  Pulmonary/Chest: Effort normal and breath sounds normal. No respiratory distress. She has no wheezes. She has no rales. Room air   Abdominal: Soft. Bowel sounds are normal. She exhibits no distension and no mass. There is no tenderness.  Neurological: She is alert. She has normal reflexes. She displays no atrophy and no tremor. She exhibits normal muscle tone. She displays no seizure activity.   Does not respond to commands. Blinked to confrontation once, but then did not. Slightly molded her hand around mine on the R. No response with the L hand. No response to Babinski   Skin: Skin is warm and dry. No rash noted. She is not diaphoretic. No erythema. No pallor.   Nursing note and vitals reviewed.      Recent Labs  Lab 08/02/18  0535 08/03/18  0418 08/04/18  0517   WBC 10.60 7.52 9.75   HGB 12.7 12.1 13.0   HCT 37.7 35.8* 39.2    155 171       Recent Labs  Lab 08/01/18  2219 08/02/18  0535  08/03/18  0418 08/04/18  0517    140 141 140   K 4.5 3.5 4.0 4.1    106 109 109   CO2 25 25 24 24   BUN 5* 6 3* 2*   CREATININE 0.6 0.6 0.5 0.5    119* 100 98   CALCIUM 8.9 8.8 8.8 9.1   MG 2.3  --   --   --    PHOS 3.3  --   --   --        Recent Labs  Lab 08/01/18  2219 08/01/18  2220   ALKPHOS 52*  --    ALT 10  --    AST 24  --    ALBUMIN 3.2*  --    PROT 6.5  --    BILITOT 0.4  --    INR  --  1.4*        Recent Labs  Lab 08/01/18 2219 08/02/18  0736 08/03/18  0100 08/03/18  0651 08/03/18  1212 08/04/18  0002   POCTGLUCOSE 103 123* 118* 108 108 80     Recent Labs      08/01/18 2219   CPK  183*       Scheduled Meds:   enoxaparin  40 mg Subcutaneous Daily    lidocaine  1 patch Transdermal Q24H    linezolid 600mg/300ml  600 mg Intravenous Q12H    lorazepam  1 mg Intravenous QID     Continuous Infusions:   dextrose 5 % and 0.45 % NaCl with KCl 20 mEq 125 mL/hr at 08/03/18 2153     As Needed:  sodium chloride 0.9%    Active Hospital Problems    Diagnosis  POA    *Catatonia [F06.1]  Yes    Major depressive disorder, severe [F32.2]  Yes    Bacteremia [R78.81]  Yes    Coagulopathy [D68.9]  Yes      Resolved Hospital Problems    Diagnosis Date Resolved POA   No resolved problems to display.       Overview      Assessment and Plan for Problems addressed today:    Catatonia     - OSH workup including LP, MRI/MRA, CTH negative  - check HIV, RPR, TSH  - vEEG ordered  - not malignant- no fever, no rigidity, no autonomic instability  - start ativan 1mg TID. Patient woke up after receiving first dose. May need to increase or patient may need ECT again  - Psych and Neuro consults  - Nutrition consult for tube feeding/PPN/TPN recs since patient has not been eating for almost 1 week     Coagulopathy     INR 1.4  May be nutritional vitamin K deficiency given long NPO status  Vitamin K SQ x1             Bacteremia     - 1/2 cultures positive on admit to OSH for gram positive. Notes from OSH state  thought to be E. Faecalis. TTE performed and negative there. Repeat blood cultures negative. ?contaminant?  - repeat blood cultures x2  - continue linezolid for now- will need to call OSH for culture results to narrow               Discharge plan and follow up    Provider    I personally scribed for Mohsen Astorga MD on 08/04/2018 at 9:57 AM. Electronically signed by christian Anderson III on 08/04/2018 at 9:57 AM

## 2018-08-04 NOTE — PLAN OF CARE
"Problem: Patient Care Overview  Goal: Plan of Care Review  Outcome: Ongoing (interventions implemented as appropriate)  POC reviewed with Pt @ bedside. Pt A/A; verbalizes understanding. NAD observed. Pt exhibits intermittent confusion during shift. Reoriented Pt x4 w/ return verbalization. BUE edematous upon observation. Reinforced pillows for elevation. This nurse  observed steady gait as Pt ambulated in room to bedside commode. Pt exhibits independence w/ mobility. Pt verbalizes anxiety regarding environment and ambulation. Pt states, "I just want to walk around a little bit, I can't stay in this bed". This nurse monitored Pt while ambulating in room. Pt tolerates w/o distress. Pt instructed to call for OOB assistance. Pt verbalizes understanding. Call light in reach. Bed in lowest position, rails x2 applied. Fall/Safety precautions maintained. Pt exhibits progress towards POC goals. Will continue to monitor.      "

## 2018-08-05 LAB
ANION GAP SERPL CALC-SCNC: 8 MMOL/L
ANISOCYTOSIS BLD QL SMEAR: SLIGHT
BASOPHILS # BLD AUTO: 0.03 K/UL
BASOPHILS NFR BLD: 0.3 %
BUN SERPL-MCNC: 3 MG/DL
CALCIUM SERPL-MCNC: 9.1 MG/DL
CHLORIDE SERPL-SCNC: 111 MMOL/L
CO2 SERPL-SCNC: 20 MMOL/L
CREAT SERPL-MCNC: 0.6 MG/DL
DIFFERENTIAL METHOD: ABNORMAL
EOSINOPHIL # BLD AUTO: 0.2 K/UL
EOSINOPHIL NFR BLD: 1.9 %
ERYTHROCYTE [DISTWIDTH] IN BLOOD BY AUTOMATED COUNT: 13.5 %
EST. GFR  (AFRICAN AMERICAN): >60 ML/MIN/1.73 M^2
EST. GFR  (NON AFRICAN AMERICAN): >60 ML/MIN/1.73 M^2
GLUCOSE SERPL-MCNC: 103 MG/DL
HCT VFR BLD AUTO: 36.8 %
HGB BLD-MCNC: 12.4 G/DL
HYPOCHROMIA BLD QL SMEAR: ABNORMAL
IMM GRANULOCYTES # BLD AUTO: 0.03 K/UL
IMM GRANULOCYTES NFR BLD AUTO: 0.3 %
LYMPHOCYTES # BLD AUTO: 2.6 K/UL
LYMPHOCYTES NFR BLD: 29.3 %
MCH RBC QN AUTO: 31.6 PG
MCHC RBC AUTO-ENTMCNC: 33.7 G/DL
MCV RBC AUTO: 94 FL
MONOCYTES # BLD AUTO: 0.9 K/UL
MONOCYTES NFR BLD: 10.1 %
NEUTROPHILS # BLD AUTO: 5.1 K/UL
NEUTROPHILS NFR BLD: 58.1 %
NRBC BLD-RTO: 0 /100 WBC
PLATELET # BLD AUTO: 158 K/UL
PLATELET BLD QL SMEAR: ABNORMAL
PMV BLD AUTO: 11.6 FL
POCT GLUCOSE: 71 MG/DL (ref 70–110)
POCT GLUCOSE: 73 MG/DL (ref 70–110)
POCT GLUCOSE: 75 MG/DL (ref 70–110)
POCT GLUCOSE: 86 MG/DL (ref 70–110)
POCT GLUCOSE: 93 MG/DL (ref 70–110)
POCT GLUCOSE: 96 MG/DL (ref 70–110)
POCT GLUCOSE: 97 MG/DL (ref 70–110)
POIKILOCYTOSIS BLD QL SMEAR: SLIGHT
POLYCHROMASIA BLD QL SMEAR: ABNORMAL
POTASSIUM SERPL-SCNC: 4.3 MMOL/L
RBC # BLD AUTO: 3.92 M/UL
SODIUM SERPL-SCNC: 139 MMOL/L
WBC # BLD AUTO: 8.77 K/UL

## 2018-08-05 PROCEDURE — 36415 COLL VENOUS BLD VENIPUNCTURE: CPT

## 2018-08-05 PROCEDURE — 63600175 PHARM REV CODE 636 W HCPCS: Performed by: PSYCHIATRY & NEUROLOGY

## 2018-08-05 PROCEDURE — 63600175 PHARM REV CODE 636 W HCPCS: Performed by: HOSPITALIST

## 2018-08-05 PROCEDURE — 99499 UNLISTED E&M SERVICE: CPT | Mod: ,,, | Performed by: PHYSICIAN ASSISTANT

## 2018-08-05 PROCEDURE — 25000003 PHARM REV CODE 250: Performed by: HOSPITALIST

## 2018-08-05 PROCEDURE — 99233 SBSQ HOSP IP/OBS HIGH 50: CPT | Mod: ,,, | Performed by: INTERNAL MEDICINE

## 2018-08-05 PROCEDURE — 25000003 PHARM REV CODE 250: Performed by: PHYSICIAN ASSISTANT

## 2018-08-05 PROCEDURE — 85025 COMPLETE CBC W/AUTO DIFF WBC: CPT

## 2018-08-05 PROCEDURE — 99232 SBSQ HOSP IP/OBS MODERATE 35: CPT | Mod: ,,, | Performed by: INTERNAL MEDICINE

## 2018-08-05 PROCEDURE — 20600001 HC STEP DOWN PRIVATE ROOM

## 2018-08-05 PROCEDURE — 80048 BASIC METABOLIC PNL TOTAL CA: CPT

## 2018-08-05 RX ORDER — DICYCLOMINE HYDROCHLORIDE 10 MG/1
10 CAPSULE ORAL ONCE AS NEEDED
Status: COMPLETED | OUTPATIENT
Start: 2018-08-05 | End: 2018-08-05

## 2018-08-05 RX ORDER — LORAZEPAM 1 MG/1
1 TABLET ORAL ONCE
Status: COMPLETED | OUTPATIENT
Start: 2018-08-05 | End: 2018-08-05

## 2018-08-05 RX ORDER — ONDANSETRON 4 MG/1
4 TABLET, ORALLY DISINTEGRATING ORAL EVERY 6 HOURS PRN
Status: DISCONTINUED | OUTPATIENT
Start: 2018-08-05 | End: 2018-08-08 | Stop reason: HOSPADM

## 2018-08-05 RX ADMIN — ENOXAPARIN SODIUM 40 MG: 100 INJECTION SUBCUTANEOUS at 05:08

## 2018-08-05 RX ADMIN — LIDOCAINE 1 PATCH: 50 PATCH TOPICAL at 10:08

## 2018-08-05 RX ADMIN — LINEZOLID 600 MG: 600 INJECTION, SOLUTION INTRAVENOUS at 12:08

## 2018-08-05 RX ADMIN — ONDANSETRON 4 MG: 4 TABLET, ORALLY DISINTEGRATING ORAL at 05:08

## 2018-08-05 RX ADMIN — DICYCLOMINE HYDROCHLORIDE 10 MG: 10 CAPSULE ORAL at 05:08

## 2018-08-05 RX ADMIN — LORAZEPAM 1 MG: 2 INJECTION INTRAMUSCULAR; INTRAVENOUS at 02:08

## 2018-08-05 RX ADMIN — LORAZEPAM 1 MG: 2 INJECTION INTRAMUSCULAR; INTRAVENOUS at 05:08

## 2018-08-05 RX ADMIN — LORAZEPAM 1 MG: 1 TABLET ORAL at 09:08

## 2018-08-05 RX ADMIN — LORAZEPAM 1 MG: 2 INJECTION INTRAMUSCULAR; INTRAVENOUS at 10:08

## 2018-08-05 NOTE — PROGRESS NOTES
MED B paged in reference to patient request to see MD at bedside. Will await Team callback. No further

## 2018-08-05 NOTE — SUBJECTIVE & OBJECTIVE
Past Medical History:   Diagnosis Date    Catatonia 2015    Depression with prior catatonia    Hypertension     Miscarriage 2012       History reviewed. No pertinent surgical history.    Review of patient's allergies indicates:   Allergen Reactions    Vancomycin analogues Rash     Red man syndrome       Medications:  Prescriptions Prior to Admission   Medication Sig    ALPRAZolam (XANAX) 1 MG tablet Take 1 mg by mouth 3 (three) times daily.    zolpidem (AMBIEN) 10 mg Tab Take 5 mg by mouth every evening.     Antibiotics     Start     Stop Route Frequency Ordered    08/01/18 2300  linezolid 600mg/300ml 600 mg/300 mL IVPB 600 mg      -- IV Every 12 hours (non-standard times) 08/01/18 2147        Antifungals     None        Antivirals     None             There is no immunization history on file for this patient.    Family History     Problem Relation (Age of Onset)    Arthritis Father    Bipolar disorder Brother    Cancer Mother, Father, Paternal Grandmother    Depression Father    Heart disease Father        Social History     Social History    Marital status: Single     Spouse name: N/A    Number of children: N/A    Years of education: N/A     Social History Main Topics    Smoking status: Current Some Day Smoker     Packs/day: 0.25    Smokeless tobacco: None    Alcohol use Yes      Comment: occasional    Drug use: No    Sexual activity: Not Asked     Other Topics Concern    None     Social History Narrative    None     Review of Systems   Unable to perform ROS: Mental status change     Objective:     Vital Signs (Most Recent):  Temp: 97 °F (36.1 °C) (08/05/18 1200)  Pulse: 88 (08/05/18 1200)  Resp: 18 (08/05/18 1200)  BP: 113/69 (08/05/18 1200)  SpO2: 97 % (08/05/18 1200) Vital Signs (24h Range):  Temp:  [96.2 °F (35.7 °C)-98.1 °F (36.7 °C)] 97 °F (36.1 °C)  Pulse:  [] 88  Resp:  [17-18] 18  SpO2:  [97 %-100 %] 97 %  BP: (107-124)/(58-72) 113/69     Weight: 68 kg (149 lb 14.6 oz)  Body mass  index is 25.73 kg/m².    Estimated Creatinine Clearance: 132.2 mL/min (based on SCr of 0.6 mg/dL).    Physical Exam   Constitutional: She appears well-developed and well-nourished. No distress.   HENT:   Head: Normocephalic and atraumatic.   Eyes: Conjunctivae are normal. No scleral icterus.   Cardiovascular: Normal rate, regular rhythm and intact distal pulses.    No murmur heard.  Pulmonary/Chest: Effort normal and breath sounds normal. No respiratory distress. She has no wheezes. She has no rales.   Abdominal: Soft. She exhibits no distension and no mass. There is no tenderness. There is no guarding.   Musculoskeletal: She exhibits no edema, tenderness or deformity.   Neurological: She has normal reflexes. She displays no atrophy and no tremor. She displays no seizure activity.   Unresponsive to commands. Blank stare. Stupor.   Skin: Skin is warm and dry. No rash noted. She is not diaphoretic. No erythema. No pallor.   Nursing note and vitals reviewed.      Significant Labs: All pertinent labs within the past 24 hours have been reviewed.    Significant Imaging: I have reviewed all pertinent imaging results/findings within the past 24 hours.

## 2018-08-05 NOTE — PROGRESS NOTES
Dr. Astorga paged reference to family request MD at bedside/ No return call/ Will await MD callback.

## 2018-08-05 NOTE — SUBJECTIVE & OBJECTIVE
"    Family History     Problem Relation (Age of Onset)    Arthritis Father    Bipolar disorder Brother    Cancer Mother, Father, Paternal Grandmother    Depression Father    Heart disease Father        Social History Main Topics    Smoking status: Current Some Day Smoker     Packs/day: 0.25    Smokeless tobacco: Not on file    Alcohol use Yes      Comment: occasional    Drug use: No    Sexual activity: Not on file     Psychotherapeutics     Start     Stop Route Frequency Ordered    08/02/18 2100  lorazepam injection 1 mg      -- IV 4 times daily 08/02/18 1733           Review of Systems  Objective:     Vital Signs (Most Recent):  Temp: 96.2 °F (35.7 °C) (08/04/18 1648)  Pulse: 88 (08/04/18 1648)  Resp: 18 (08/04/18 1648)  BP: (!) 124/58 (08/04/18 1648)  SpO2: 99 % (08/04/18 1648) Vital Signs (24h Range):  Temp:  [96.2 °F (35.7 °C)-98.8 °F (37.1 °C)] 96.2 °F (35.7 °C)  Pulse:  [] 88  Resp:  [17-19] 18  SpO2:  [96 %-99 %] 99 %  BP: (108-160)/(58-87) 124/58     Height: 5' 4" (162.6 cm)  Weight: 68 kg (149 lb 14.6 oz)  Body mass index is 25.73 kg/m².      Intake/Output Summary (Last 24 hours) at 08/04/18 1938  Last data filed at 08/04/18 0600   Gross per 24 hour   Intake                0 ml   Output              600 ml   Net             -600 ml       Physical Exam    Mental Status Exam:  Appearance: unremarkable, age appropriate, lying in bed, overweight   Behavior: cooperative, psychomotor retardation   Speech/Language: slowed, soft, monotone   Mood: euthymic   Affect: blunted    Thought Process:  concrete   Thought Content: normal, no suicidality, no homicidality, delusions, or paranoia   Orientation: grossly intact   Cognition: improving   Insight: good   Judgment: fair       Significant Labs:   Last 24 Hours:   Recent Lab Results       08/04/18  1840 08/04/18  1204 08/04/18  0517 08/04/18  0002      Immature Granulocytes   0.3      Immature Grans (Abs)   0.03  Comment:  Mild elevation in immature " granulocytes is non specific and   can be seen in a variety of conditions including stress response,   acute inflammation, trauma and pregnancy. Correlation with other   laboratory and clinical findings is essential.        Anion Gap   7(L)      Baso #   0.03      Basophil%   0.3      BUN, Bld   2(L)      Calcium   9.1      Chloride   109      CO2   24      Creatinine   0.5      Differential Method   Automated      eGFR if    >60.0      eGFR if non    >60.0  Comment:  Calculation used to obtain the estimated glomerular filtration  rate (eGFR) is the CKD-EPI equation.         Eos #   0.2      Eosinophil%   1.6      Glucose   98      Gran # (ANC)   6.0      Gran%   61.2      Hematocrit   39.2      Hemoglobin   13.0      Lymph #   2.8      Lymph%   28.7      MCH   31.9(H)      MCHC   33.2      MCV   96      Mono #   0.8      Mono%   7.9      MPV   11.4      nRBC   0      Platelets   171      POCT Glucose 85 80  80     Potassium   4.1      RBC   4.07      RDW   13.5      Sodium   140      WBC   9.75            Significant Imaging: I have reviewed all pertinent imaging results/findings within the past 24 hours.

## 2018-08-05 NOTE — CONSULTS
[facility]  Infectious Disease  Consult Note    Patient Name: Rupal Mcintyre  MRN: [id number]  Admission Date: 8/1/2018  Hospital Length of Stay: 4 days  Attending Physician: Mohsen Astorga MD  Primary Care Provider: Primary Doctor No     Isolation Status: No active isolations    Patient information was obtained from patient and past medical records.      Consults  Assessment/Plan:     Bacteremia       28 year old female with history of depression and catatonia transferred to [facility] for psych evaluation of catatonic symptoms. ID consulted for E. Faecalis bacteremia treatment recommendations. Patient was admitted to [facility] on 7/30 with AMS and catatonia. During her admission her WBC count trended up and a blood culture set drawn returned positive for Enterococcus per primary team notes. She was started on empiric antibiotics and is currently on Zyvox alone. Repeat blood cultures are negative. Leukocytosis resolved. She has been afebrile with stable vitals. Unresponsive at time of visit but non septic appearing.     Recommendations  - Unclear significance of positive blood culture set at OSH. Suspect this is possibly a contaminant as all repeat cultures are negative. She is afebrile without systemic evidence of infection. She has completed about 6 days of antibiotic therapy. Currently on Zyvox which is known to have drug interactions with psychiatric medications. At this time would discontinue Zyvox and monitor patient off of antibiotics. If she develops signs of infection or if there is any infectious concerns, please re-consult ID. ID will sign off.            Thank you for the consult. Please call for any questions.  Rand Vogel PA-C  Phone: [phone]  Pager: [phone]    Subjective:     Principal Problem: Catatonia    HPI: Rupal Mcintyre is a 28 year old female with a history of depression and catatonia transferred to [facility] for psych evaluation of catatonic symptoms. ID consulted  for possible E. Faecalis bacteremia treatment recommendations. Patient was admitted to [facility] on 7/30 with AMS and catatonia. At admission she was dehydrated with an elevated lactic acid and Na level. Her WBC was normal, then merry to 18k, then returned to normal. She had blood cultures drawn, which per chart review turned positive 1 of 2 with GPC for which she was started on Vancomyin and Rocephin.  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 Blood culture isolate returned as Enterococcus per primary team notes. TTE negative. Unclear source of her bacteremia. Possibly even a contaminant. Work up of AMS included MRI of brain which was negative. LP negative. Patient is lying in bed with eyes open but does not respond to questions. Team discussed case with the patient's father - he states this happened 3 years ago and was hospitalized at [facility] for catatonia associated with depression. Required ECT which was successful. Became unresponsive again 6-7 days ago off and on. She had decreased PO intake. Father states that first episode was triggered by patient's mother's death. He thinks that paternal grandmother's death (3 years ago) and recent move into her grandmother's house due to snakes infesting her trailer (3 months ago). This move made patient depressed according to her father. He also thinks that break up with her boyfriend (3-4 years ago). may have triggered it. On exam - patient is completely unresponsive. Patient demonstrates mutism and stupor with no reactivity to painful stimuli. Patient has a blank stare and only blinks. She has had no fevers this admission. No leukocytosis. Blood cultures are negative.    Past Medical History:   Diagnosis Date    Catatonia 2015    Depression with prior catatonia    Hypertension     Miscarriage 2012       History reviewed. No pertinent surgical history.    Review of patient's allergies indicates:    Allergen Reactions    Vancomycin analogues Rash     Red man syndrome       Medications:  Prescriptions Prior to Admission   Medication Sig    ALPRAZolam (XANAX) 1 MG tablet Take 1 mg by mouth 3 (three) times daily.    zolpidem (AMBIEN) 10 mg Tab Take 5 mg by mouth every evening.     Antibiotics     Start     Stop Route Frequency Ordered    08/01/18 2300  linezolid 600mg/300ml 600 mg/300 mL IVPB 600 mg      -- IV Every 12 hours (non-standard times) 08/01/18 2147        Antifungals     None        Antivirals     None             There is no immunization history on file for this patient.    Family History     Problem Relation (Age of Onset)    Arthritis Father    Bipolar disorder Brother    Cancer Mother, Father, Paternal Grandmother    Depression Father    Heart disease Father        Social History     Social History    Marital status: Single     Spouse name: N/A    Number of children: N/A    Years of education: N/A     Social History Main Topics    Smoking status: Current Some Day Smoker     Packs/day: 0.25    Smokeless tobacco: None    Alcohol use Yes      Comment: occasional    Drug use: No    Sexual activity: Not Asked     Other Topics Concern    None     Social History Narrative    None     Review of Systems   Unable to perform ROS: Mental status change     Objective:     Vital Signs (Most Recent):  Temp: 97 °F (36.1 °C) (08/05/18 1200)  Pulse: 88 (08/05/18 1200)  Resp: 18 (08/05/18 1200)  BP: 113/69 (08/05/18 1200)  SpO2: 97 % (08/05/18 1200) Vital Signs (24h Range):  Temp:  [96.2 °F (35.7 °C)-98.1 °F (36.7 °C)] 97 °F (36.1 °C)  Pulse:  [] 88  Resp:  [17-18] 18  SpO2:  [97 %-100 %] 97 %  BP: (107-124)/(58-72) 113/69     Weight: 68 kg (149 lb 14.6 oz)  Body mass index is 25.73 kg/m².    Estimated Creatinine Clearance: 132.2 mL/min (based on SCr of 0.6 mg/dL).    Physical Exam   Constitutional: She appears well-developed and well-nourished. No distress.   HENT:   Head: Normocephalic and  atraumatic.   Eyes: Conjunctivae are normal. No scleral icterus.   Cardiovascular: Normal rate, regular rhythm and intact distal pulses.    No murmur heard.  Pulmonary/Chest: Effort normal and breath sounds normal. No respiratory distress. She has no wheezes. She has no rales.   Abdominal: Soft. She exhibits no distension and no mass. There is no tenderness. There is no guarding.   Musculoskeletal: She exhibits no edema, tenderness or deformity.   Neurological: She has normal reflexes. She displays no atrophy and no tremor. She displays no seizure activity.   Unresponsive to commands. Blank stare. Stupor.   Skin: Skin is warm and dry. No rash noted. She is not diaphoretic. No erythema. No pallor.   Nursing note and vitals reviewed.      Significant Labs: All pertinent labs within the past 24 hours have been reviewed.    Significant Imaging: I have reviewed all pertinent imaging results/findings within the past 24 hours.

## 2018-08-05 NOTE — PROGRESS NOTES
[facility]  Psychiatry  Progress Note    Patient Name: Rupal Mcintyre  MRN: [id number]   Code Status: Full Code  Admission Date: 8/1/2018  Hospital Length of Stay: 3 days  Expected Discharge Date: 8/7/2018  Attending Physician: Mohsen Astorga MD  Primary Care Provider: Primary Doctor No    Current Legal Status: N/A    Patient information was obtained from patient and ER records.     Subjective:     Principal Problem:Catatonia    Chief Complaint: Catatonia    HPI: Rupal Mcintyre is a 28 y.o. female with a history of depression and ADHD who presented to [facility] due to AMS. Psychiatry was consulted to address the patient's symptom of catatonia.    HPI per Primary Team:   Patient experienced an episode of catatonia in 2015 after the death of her mother, which led to a stint in an inpatient psychiatric facility. She was found altered and brought to the ED (nobody can provide collateral details of this, and she has had no family visit or return calls).  Since admit she will only blink and stare ahead.  At admission she was dehydrated with an elevated lactic acid and Na level.  This morning she became slightly more lucid and was alert x3, only to return to her previous catatonic state. At admission her WBC was normal, then merry to 18k, then returned to normal.  She had blood cxs done, which turned positive 1 of 2 with GPC (too early to know if chains or clusters), for which she was started on Vancomyin and Rocephin.  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  The sending MD was unable to perform a LP due to her being given Lovenox for DVT prophylaxis.    Patient is lying in bed with eyes open but does not respond to questions. Called father Kennedy Mcintyre- he states this happened 3 years ago and was hospitalized at [facility] for catatonia associated with depression. Required ECT which was successful. Became unresponsive again 6-7 days ago off and on.  "She had decreased PO intake. Father states that first episode was triggered by patient's mother's death. He thinks that paternal grandmother's death (3 years ago) and recent move into her grandmother's house due to snakes infesting her trailer (3 months ago). This move made patient depressed according to her father. He also thinks that break up with her boyfriend (3-4 years ago). may have triggered it. Patient says that she has been depressed for "a while" according to her father. She follows with Dr Arenas in [place] and receives meds from [facility] pharmacy in [place] (not open currently).      After giving 1mg ativan x1, patient woke up. She was disoriented but then realized she was in [place]. She says she has had "bad depression" but could not elaborate further. She says she takes xanax PRN and no other psych meds. She complains of pain in the bad of her neck.     Psychiatry Consult:  HPI limited due to patient's current condition. Patient demonstrates mutism and stupor with no reactivity to painful stimuli. Patient has a blank stare and only blinks. Upon lifting her arm into the arm, her arm immediately drops down. Per patient's sitter, the patient has remained in the same position for the entire duration that the sitter has been with her (approximately 3 hours). Sitter states that when the patient has been moved to a different position, she will return back to her initial position. The sitter reports that the patient has grunted on a few occasions, but has not spoken at all. Unable to assess for mood, SI, HI, and AVH.    Per the Luther-Alvino Catatonia Rating Scale, the patient is positive for immobility/stupor (3 pts), mutism (3pts), staring (3 pts), and negativism (3pts), which is indicative of catatonia. The patient also failed CAM-ICU (demonstrated AMS, failed SAVEAHAART test for inattention, and demonstrated altered level of consciousness), which is indicative of delirium.    Per [facility] Chart " Review:  [facility] evaluated the patient for catatonia on 1/8/2016. The patient was initially started on Ativan 1mg IV TID and Amantadine 100mg NG TID which was ineffective, so consent was obtained from the patient's father for ECT. The patient returned to baseline after two ECT sessions and was started on Celexa 20mg daily for MDD.    Collateral: Kennedy Mcintyre (father) [phone]    Psychiatric Review of Systems-is patient experiencing or having changes in  sleep: unable to assess  appetite: unable to assess  weight: unable to assess  energy/anergy: unable to assess  interest/pleasure/anhedonia: unable to assess  somatic symptoms: unable to assess  libido: unable to assess  anxiety/panic: unable to assess  guilty/hopelessness: unable to assess  concentration: unable to assess  S.I.B.s/risky behavior: unable to assess  any drugs: unable to assess  alcohol: unable to assess     History obtained from chart review and [facility] records due to patient's current condition:     Past Psychiatric History:  Previous Medication Trials: Cymbalta, Celexa, Adderall, Xanax, Ambien   Previous Psychiatric Hospitalizations: no   Previous Suicide Attempts: no   History of Violence: no  Outpatient Psychiatrist: Dr. Cormier in [place] ([phone])    Social History:  Marital Status: not   Children: 0   Employment Status/Info: currently unemployed  Education: 10th grade  Special Ed: no  Housing Status: grandmother's house  History of phys/sexual abuse: no  Access to gun: no    Substance Abuse History:  Recreational Drugs: none  Use of Alcohol: denied  Rehab History:no   Tobacco Use:no  Use of OTC: unknown    Legal History:  Past Charges/Incarcerations:yes, she and boyfriend were arrested for marijuana possession around ~2011   Pending charges:no     Family Psychiatric History:   Brother with depression and ADHD    Psychosocial Stressors: recent move from trailer to grandmother's house  Functioning Relationships: good  "relationship with father    Hospital Course: 08/03/2018   Chart reviewed. Upon initiation of interview, pt was lying in bed, dressed in hospital gown. No distress noted. No acute events overnight. Patient continues to be nonverbal and not able to follow vocal commands.  When medical student evaluated the patient it was reported that she did have some eye tracking however this is not appreciated on my evaluation. For the entire duration of my evaluation she had no apparent response or interaction with her external environment.      Per chart review there have been periods of improvement of her catatonic symptoms as she has been able to make short vocalizations to nursing and other providers.    08/04/2018  Upon my evaluation patient is lying in bed talking coherently to her brother on the telephone. She acknowledges me as "the doctor" as I enter the room and asks her brother if she can call back. Reports that she is in the hospital for Catatonia and accurately describes the symptoms. She is linear, alert and oriented x 4 with a blunted affect. She accurately describes the television show she is watching. Does not have much of an appetite. Denies SI/HI/AVH          Family History     Problem Relation (Age of Onset)    Arthritis Father    Bipolar disorder Brother    Cancer Mother, Father, Paternal Grandmother    Depression Father    Heart disease Father        Social History Main Topics    Smoking status: Current Some Day Smoker     Packs/day: 0.25    Smokeless tobacco: Not on file    Alcohol use Yes      Comment: occasional    Drug use: No    Sexual activity: Not on file     Psychotherapeutics     Start     Stop Route Frequency Ordered    08/02/18 2100  lorazepam injection 1 mg      -- IV 4 times daily 08/02/18 1748           Review of Systems  Objective:     Vital Signs (Most Recent):  Temp: 96.2 °F (35.7 °C) (08/04/18 1648)  Pulse: 88 (08/04/18 1648)  Resp: 18 (08/04/18 1648)  BP: (!) 124/58 (08/04/18 " "1648)  SpO2: 99 % (08/04/18 1648) Vital Signs (24h Range):  Temp:  [96.2 °F (35.7 °C)-98.8 °F (37.1 °C)] 96.2 °F (35.7 °C)  Pulse:  [] 88  Resp:  [17-19] 18  SpO2:  [96 %-99 %] 99 %  BP: (108-160)/(58-87) 124/58     Height: 5' 4" (162.6 cm)  Weight: 68 kg (149 lb 14.6 oz)  Body mass index is 25.73 kg/m².      Intake/Output Summary (Last 24 hours) at 08/04/18 1938  Last data filed at 08/04/18 0600   Gross per 24 hour   Intake                0 ml   Output              600 ml   Net             -600 ml       Physical Exam    Mental Status Exam:  Appearance: unremarkable, age appropriate, lying in bed, overweight   Behavior: cooperative, psychomotor retardation   Speech/Language: slowed, soft, monotone   Mood: euthymic   Affect: blunted    Thought Process:  concrete   Thought Content: normal, no suicidality, no homicidality, delusions, or paranoia   Orientation: grossly intact   Cognition: improving   Insight: good   Judgment: fair       Significant Labs:   Last 24 Hours:   Recent Lab Results       08/04/18  1840 08/04/18  1204 08/04/18  0517 08/04/18  0002      Immature Granulocytes   0.3      Immature Grans (Abs)   0.03  Comment:  Mild elevation in immature granulocytes is non specific and   can be seen in a variety of conditions including stress response,   acute inflammation, trauma and pregnancy. Correlation with other   laboratory and clinical findings is essential.        Anion Gap   7(L)      Baso #   0.03      Basophil%   0.3      BUN, Bld   2(L)      Calcium   9.1      Chloride   109      CO2   24      Creatinine   0.5      Differential Method   Automated      eGFR if    >60.0      eGFR if non    >60.0  Comment:  Calculation used to obtain the estimated glomerular filtration  rate (eGFR) is the CKD-EPI equation.         Eos #   0.2      Eosinophil%   1.6      Glucose   98      Gran # (ANC)   6.0      Gran%   61.2      Hematocrit   39.2      Hemoglobin   13.0      Lymph #   " 2.8      Lymph%   28.7      MCH   31.9(H)      MCHC   33.2      MCV   96      Mono #   0.8      Mono%   7.9      MPV   11.4      nRBC   0      Platelets   171      POCT Glucose 85 80  80     Potassium   4.1      RBC   4.07      RDW   13.5      Sodium   140      WBC   9.75            Significant Imaging: I have reviewed all pertinent imaging results/findings within the past 24 hours.    Assessment/Plan:     * Catatonia    - Symptoms of catatonia much improved today. Patient is alert and oriented x 4, linear, organized and demonstrates spontaneous speech.  - Previous catatonic episode successfully treated with ECT at [facility] in January 2016  - Continue Ativan IV 1 mg every 6 hours.  If she develops worsening of her catatonia recommend increasing to 2 mg every six hours.  - If improvement is not sustained on Ativan, would try to obtain consent from patient's father for ECT as she has shown good response to this treatment in the past. ECT will require transfer to [facility]. Will discuss transferring patient with Dr. Maurice Astorga at [facility]. Literature review indicates no contraindications to ECT while on linezolid if transfer is required prior to completion of her abx course.   - Continue Lovenox DVT prophylaxis due to patient's current immobility/stupor  - Continue to monitor for dehydration and malnutrition by monitoring I/O, continuing IVF, and nutrition consult for tube feeds vs TPN/PPN  -         Major depressive disorder, severe    - Previous trials of Cymbalta and Celexa  - Consider addition of antidepressant after Zyvox treatment has been completed and coordinate with outpatient psychiatrist Dr. Cormier             Need for Continued Hospitalization:   Requires ongoing hospitalization for stabilization of medications.    Anticipated Disposition: Home or Self Care     Total time:  35 with greater than 50% of this time spent in counseling and/or coordination of care.       Ponce Everett, DO   Psychiatry  [facility]

## 2018-08-05 NOTE — MEDICAL/APP STUDENT
Hospital Medicine  Progress note    Team: [facility] [place] Sergey Anderson  Admit Date: 8/1/2018  KRISHNA 8/7/2018  Code status: Full Code    Principal Problem:  Catatonia    Interval hx: Psych states that patient is much improved yesterday, alert and oriented x4, linear, organized and demonstrates spontaneous speech. Has been catatonic every visit for hospital medicine. ID consulted for recommendations.    ROS   Unable to perform ROS: Mental status change       PEx  Temp:  [96.2 °F (35.7 °C)-98.1 °F (36.7 °C)]   Pulse:  []   Resp:  [17-18]   BP: (107-160)/(58-87)   SpO2:  [97 %-100 %]   No intake or output data in the 24 hours ending 08/05/18 0944     Constitutional: She appears well-developed and well-nourished. No distress. obese   Cardiovascular: Normal rate, regular rhythm, normal heart sounds and intact distal pulses.  Exam reveals no gallop and no friction rub.    No murmur heard.  Pulmonary/Chest: Effort normal and breath sounds normal. No respiratory distress. She has no wheezes. She has no rales. Room air   Abdominal: Soft. Bowel sounds are normal. She exhibits no distension and no mass. There is no tenderness.  Neurological: She is alert. She has normal reflexes. She displays no atrophy and no tremor. She exhibits normal muscle tone. She displays no seizure activity.   Does not respond to commands. Blinked to confrontation once, but then did not. Slightly molded her hand around mine on the R. No response with the L hand. No response to Babinski   Skin: Skin is warm and dry. No rash noted. She is not diaphoretic. No erythema. No pallor.   Nursing note and vitals reviewed.      Recent Labs  Lab 08/03/18  0418 08/04/18  0517 08/05/18  0608   WBC 7.52 9.75 8.77   HGB 12.1 13.0 12.4   HCT 35.8* 39.2 36.8*    171 158       Recent Labs  Lab 08/01/18  2219  08/03/18  0418 08/04/18  0517 08/05/18  0608     < > 141 140 139   K 4.5  < > 4.0 4.1 4.3     < > 109 109 111*   CO2 25  < > 24 24 20*   BUN  5*  < > 3* 2* 3*   CREATININE 0.6  < > 0.5 0.5 0.6     < > 100 98 103   CALCIUM 8.9  < > 8.8 9.1 9.1   MG 2.3  --   --   --   --    PHOS 3.3  --   --   --   --    < > = values in this interval not displayed.    Recent Labs  Lab 08/01/18  2219 08/01/18  2220   ALKPHOS 52*  --    ALT 10  --    AST 24  --    ALBUMIN 3.2*  --    PROT 6.5  --    BILITOT 0.4  --    INR  --  1.4*        Recent Labs  Lab 08/04/18  1204 08/04/18  1840 08/05/18  0050 08/05/18  0142 08/05/18  0201 08/05/18  0724   POCTGLUCOSE 80 85 73 75 97 93     No results for input(s): CPK, CPKMB, MB, TROPONINI in the last 72 hours.    Scheduled Meds:   enoxaparin  40 mg Subcutaneous Daily    lidocaine  1 patch Transdermal Q24H    linezolid 600mg/300ml  600 mg Intravenous Q12H    lorazepam  1 mg Intravenous QID     Continuous Infusions:   dextrose 5 % and 0.45 % NaCl with KCl 20 mEq 125 mL/hr at 08/04/18 2300     As Needed:  ondansetron, sodium chloride 0.9%    Active Hospital Problems    Diagnosis  POA    *Catatonia [F06.1]  Yes    Major depressive disorder, severe [F32.2]  Yes    Bacteremia [R78.81]  Yes    Coagulopathy [D68.9]  Yes      Resolved Hospital Problems    Diagnosis Date Resolved POA   No resolved problems to display.       Overview  Ms Rupal Mcintyre is a 28 y.o. woman with depression and prior catatonia who presents as transfer from [facility] for catatonia nad Neuro/Psych evaluation. Patient is lying in bed with eyes open but does not respond to questions. Called father Kennedy Mcintyre- he states this happened 3 years ago and was hospitalized at [facility] for catatonia associated with depression. Required ECT which was successful. Became unresponsive again 6-7 days ago off and on. She had decreased PO intake. Father states that first episode was triggered by patient's mother's death. He thinks that paternal grandmother's death (3 years ago) and recent move into her grandmother's house due to snakes infesting her trailer  "(3 months ago). This move made patient depressed according to her father. He also thinks that break up with her boyfriend (3-4 years ago). may have triggered it. Patient says that she has been depressed for "a while" according to her father. She follows with Dr Arenas in [place] and receives meds from [facility] pharmacy in [place] (not open currently).    Patient given 2mg ativan as per psych recommendations and to watch patient, if no improvement get father consent for ECT. Patient appears to be improving with psych and nursing staff, ativan decreased to 1mg q6hrs. Patient still catatonic for hospital medicine.     Assessment and Plan for Problems addressed today:    Catatonia     - OSH workup including LP, MRI/MRA, CTH negative  - check HIV, RPR, TSH  - vEEG ordered  - not malignant- no fever, no rigidity, no autonomic instability  -Ativan at 1mg q6hrs, if no improvement consider father consent for ECT     Coagulopathy     INR 1.4  May be nutritional vitamin K deficiency given long NPO status  Vitamin K SQ x1             Bacteremia     - 1/2 cultures positive on admit to OSH for gram positive. Notes from OSH state thought to be E. Faecalis. TTE performed and negative there. Repeat blood cultures negative. ?contaminant?  - repeat blood cultures x2  - continue linezolid for now- will need to call OSH for culture results to narrow               Discharge plan and follow up    Provider    I personally scribed for Mohsen Astorga MD on 08/05/2018 at 9:52 AM. Electronically signed by christian Anderson III on 08/05/2018 at 9:52 AM    "

## 2018-08-05 NOTE — CONSULTS
[facility]  Infectious Disease  Consult Note    Patient Name: Rupal Mcintyre  MRN: [id number]  Admission Date: 8/1/2018  Hospital Length of Stay: 4 days  Attending Physician: Ernesto Carbajal MD  Primary Care Provider: Primary Doctor No       Inpatient consult to Infectious Diseases  Consult performed by: RAND VOGEL  Consult ordered by: ERNESTO CARBAJAL consult received. Chart being reviewed. Full consult note with recommendations to follow.      Thank you,  Rand Vogel PA-C  [phone]

## 2018-08-05 NOTE — PLAN OF CARE
"Problem: Patient Care Overview  Goal: Plan of Care Review  Outcome: Ongoing (interventions implemented as appropriate)  POC reviewed w/Pt @ bedside. Pt A/A; verbalizes understanding. NAD observed. Upon assessment Pt exhibits acute confusion. This nurse reoriented Pt x4 with return verbalization, encouraged Pt to express feelings, needs, concerns. Pt states, "I'm okay right now". Dietary tray observed @ bedside. 0 Intake noted. Clear liquid diet maintained. This nurse encouraged intake, Pt refuses. Pt states, "My stomach hurts and I feel nauseous". This nurse notified Hilario Julien w/ MED B team. PA advised Nurse to advance diet as tolerated; new orders acknowledged. Pt exhibits AROM to all extremities w/o distress. This nurse observed excessive spitting during shift; emesis cup, oral care provided as needed. Pt appears restless, exhibits some anxiety during shift. Pt ambulated in room on several occurrences w/o request for assistance. Pt states, "I just feel so aggravated". This nurse allowed Pt to express feelings. Reinforced use of call light,. Instructed Pt to call for OOB assistance. Pt verbalizes understanding. Call light in reach. Bed in lowest position, rails x2 applied. Fall/Safety precautions maintained.Will continue to monitor      "

## 2018-08-05 NOTE — HPI
Rupal Mcintyre is a 28 year old female with a history of depression and catatonia transferred to [facility] for psych evaluation of catatonic symptoms. ID consulted for possible E. Faecalis bacteremia treatment recommendations. Patient was admitted to  on 7/30 with AMS and catatonia. At admission she was dehydrated with an elevated lactic acid and Na level. Her WBC was normal, then merry to 18k, then returned to normal. She had blood cultures drawn, which per chart review turned positive 1 of 2 with GPC for which she was started on Vancomyin and Rocephin.  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 Blood culture isolate returned as Enterococcus per primary team notes. TTE negative. Unclear source of her bacteremia. Possibly even a contaminant. Work up of AMS included MRI of brain which was negative. LP negative. Patient is lying in bed with eyes open but does not respond to questions. Team discussed case with the patient's father - he states this happened 3 years ago and was hospitalized at [facility] for catatonia associated with depression. Required ECT which was successful. Became unresponsive again 6-7 days ago off and on. She had decreased PO intake. Father states that first episode was triggered by patient's mother's death. He thinks that paternal grandmother's death (3 years ago) and recent move into her grandmother's house due to snakes infesting her trailer (3 months ago). This move made patient depressed according to her father. He also thinks that break up with her boyfriend (3-4 years ago). may have triggered it. On exam - patient is completely unresponsive. Patient demonstrates mutism and stupor with no reactivity to painful stimuli. Patient has a blank stare and only blinks. She has had no fevers this admission. No leukocytosis. Blood cultures are negative.

## 2018-08-05 NOTE — PROGRESS NOTES
Hospital  contacted in reference to no callback x 3. Dr. Astorga connected via Public service / utilizing hospital/Nurses Spect[employer]./ No further

## 2018-08-06 LAB
ANION GAP SERPL CALC-SCNC: 8 MMOL/L
BACTERIA BLD CULT: NORMAL
BACTERIA BLD CULT: NORMAL
BASOPHILS # BLD AUTO: 0.03 K/UL
BASOPHILS NFR BLD: 0.3 %
BUN SERPL-MCNC: 4 MG/DL
CALCIUM SERPL-MCNC: 8.9 MG/DL
CHLORIDE SERPL-SCNC: 105 MMOL/L
CO2 SERPL-SCNC: 24 MMOL/L
CREAT SERPL-MCNC: 0.5 MG/DL
DIFFERENTIAL METHOD: ABNORMAL
EOSINOPHIL # BLD AUTO: 0.1 K/UL
EOSINOPHIL NFR BLD: 1.3 %
ERYTHROCYTE [DISTWIDTH] IN BLOOD BY AUTOMATED COUNT: 13.7 %
EST. GFR  (AFRICAN AMERICAN): >60 ML/MIN/1.73 M^2
EST. GFR  (NON AFRICAN AMERICAN): >60 ML/MIN/1.73 M^2
GLUCOSE SERPL-MCNC: 88 MG/DL
HCT VFR BLD AUTO: 37.5 %
HGB BLD-MCNC: 12.5 G/DL
IMM GRANULOCYTES # BLD AUTO: 0.03 K/UL
IMM GRANULOCYTES NFR BLD AUTO: 0.3 %
LYMPHOCYTES # BLD AUTO: 2.2 K/UL
LYMPHOCYTES NFR BLD: 24.2 %
MCH RBC QN AUTO: 32 PG
MCHC RBC AUTO-ENTMCNC: 33.3 G/DL
MCV RBC AUTO: 96 FL
MONOCYTES # BLD AUTO: 0.9 K/UL
MONOCYTES NFR BLD: 9.4 %
NEUTROPHILS # BLD AUTO: 6 K/UL
NEUTROPHILS NFR BLD: 64.5 %
NRBC BLD-RTO: 0 /100 WBC
PLATELET # BLD AUTO: 181 K/UL
PMV BLD AUTO: 10.9 FL
POCT GLUCOSE: 91 MG/DL (ref 70–110)
POTASSIUM SERPL-SCNC: 3.6 MMOL/L
RBC # BLD AUTO: 3.91 M/UL
SODIUM SERPL-SCNC: 137 MMOL/L
WBC # BLD AUTO: 9.27 K/UL

## 2018-08-06 PROCEDURE — 36415 COLL VENOUS BLD VENIPUNCTURE: CPT

## 2018-08-06 PROCEDURE — 25000003 PHARM REV CODE 250: Performed by: HOSPITALIST

## 2018-08-06 PROCEDURE — 85025 COMPLETE CBC W/AUTO DIFF WBC: CPT

## 2018-08-06 PROCEDURE — 99232 SBSQ HOSP IP/OBS MODERATE 35: CPT | Mod: ,,, | Performed by: INTERNAL MEDICINE

## 2018-08-06 PROCEDURE — 63600175 PHARM REV CODE 636 W HCPCS: Performed by: HOSPITALIST

## 2018-08-06 PROCEDURE — 99232 SBSQ HOSP IP/OBS MODERATE 35: CPT | Mod: ,,, | Performed by: PSYCHIATRY & NEUROLOGY

## 2018-08-06 PROCEDURE — 20600001 HC STEP DOWN PRIVATE ROOM

## 2018-08-06 PROCEDURE — 80048 BASIC METABOLIC PNL TOTAL CA: CPT

## 2018-08-06 PROCEDURE — 63600175 PHARM REV CODE 636 W HCPCS: Performed by: PSYCHIATRY & NEUROLOGY

## 2018-08-06 RX ADMIN — LORAZEPAM 1 MG: 2 INJECTION INTRAMUSCULAR; INTRAVENOUS at 08:08

## 2018-08-06 RX ADMIN — DEXTROSE MONOHYDRATE, SODIUM CHLORIDE, AND POTASSIUM CHLORIDE: 50; 4.5; 1.49 INJECTION, SOLUTION INTRAVENOUS at 08:08

## 2018-08-06 RX ADMIN — LORAZEPAM 1 MG: 2 INJECTION INTRAMUSCULAR; INTRAVENOUS at 04:08

## 2018-08-06 RX ADMIN — LORAZEPAM 1 MG: 2 INJECTION INTRAMUSCULAR; INTRAVENOUS at 01:08

## 2018-08-06 RX ADMIN — ENOXAPARIN SODIUM 40 MG: 100 INJECTION SUBCUTANEOUS at 04:08

## 2018-08-06 NOTE — PROGRESS NOTES
Hospital Medicine  Progress note    I personally performed the history, physical exam and medical decision making: and confirmed the accuracy of the information in the transcribed note.  Mohsen Astorga M.D.  Pager: [phone]  Cell Phone: [phone]    [place] [facility] [place] Mohsen Astorga MD  Admit Date: 8/1/2018  KRISHNA 8/7/2018  Code status: Full Code    Principal Problem:  Catatonia    Interval hx:Patient conversant this morning and had a liquid diet breakfast. Agreed to regular diet for lunch and will try to eat today. Patient still not moving more than her eyes, mouth and head. Infectious disease states that antibiotics can be discontinued.      ROS   Unable to perform ROS: Mental status change       PEx  Temp:  [96.3 °F (35.7 °C)-99 °F (37.2 °C)]   Pulse:  [73-91]   Resp:  [18]   BP: (111-122)/(56-79)   SpO2:  [96 %-100 %]     Intake/Output Summary (Last 24 hours) at 08/06/18 1512  Last data filed at 08/06/18 1200   Gross per 24 hour   Intake              410 ml   Output                0 ml   Net              410 ml        Constitutional: She appears well-developed and well-nourished. No distress. obese   Cardiovascular: Normal rate, regular rhythm, normal heart sounds and intact distal pulses.  Exam reveals no gallop and no friction rub.    No murmur heard.  Pulmonary/Chest: Effort normal and breath sounds normal. No respiratory distress. She has no wheezes. She has no rales. Room air   Abdominal: Soft. Bowel sounds are normal. She exhibits no distension and no mass. There is no tenderness.  Neurological: She is alert. She has normal reflexes. She displays no atrophy and no tremor. She exhibits normal muscle tone. She displays no seizure activity.   Does not respond to commands. Blinked to confrontation once, but then did not. Slightly molded her hand around mine on the R. No response with the L hand. No response to Babinski   Skin: Skin is warm and dry. No rash noted. She is not diaphoretic. No erythema. No  pallor.   Nursing note and vitals reviewed.      Recent Labs  Lab 08/04/18  0517 08/05/18  0608 08/06/18  0509   WBC 9.75 8.77 9.27   HGB 13.0 12.4 12.5   HCT 39.2 36.8* 37.5    158 181       Recent Labs  Lab 08/01/18  2219  08/04/18  0517 08/05/18  0608 08/06/18  0509     < > 140 139 137   K 4.5  < > 4.1 4.3 3.6     < > 109 111* 105   CO2 25  < > 24 20* 24   BUN 5*  < > 2* 3* 4*   CREATININE 0.6  < > 0.5 0.6 0.5     < > 98 103 88   CALCIUM 8.9  < > 9.1 9.1 8.9   MG 2.3  --   --   --   --    PHOS 3.3  --   --   --   --    < > = values in this interval not displayed.    Recent Labs  Lab 08/01/18  2219 08/01/18  2220   ALKPHOS 52*  --    ALT 10  --    AST 24  --    ALBUMIN 3.2*  --    PROT 6.5  --    BILITOT 0.4  --    INR  --  1.4*        Recent Labs  Lab 08/05/18  0142 08/05/18  0201 08/05/18  0724 08/05/18  1222 08/05/18  1748 08/05/18  2051   POCTGLUCOSE 75 97 93 96 86 71     No results for input(s): CPK, CPKMB, MB, TROPONINI in the last 72 hours.    Scheduled Meds:   enoxaparin  40 mg Subcutaneous Daily    lidocaine  1 patch Transdermal Q24H    lorazepam  1 mg Intravenous QID     Continuous Infusions:   dextrose 5 % and 0.45 % NaCl with KCl 20 mEq 125 mL/hr at 08/04/18 2300     As Needed:  ondansetron, sodium chloride 0.9%    Active Hospital Problems    Diagnosis  POA    *Catatonia [F06.1]  Yes    Major depressive disorder, severe [F32.2]  Yes    Bacteremia [R78.81]  Yes    Coagulopathy [D68.9]  Yes      Resolved Hospital Problems    Diagnosis Date Resolved POA   No resolved problems to display.       Overview  Ms Rupal Mcintyre is a 28 y.o. woman with depression and prior catatonia who presents as transfer from [facility] for catatonia nad Neuro/Psych evaluation. Patient is lying in bed with eyes open but does not respond to questions. Called father Kennedy Mcintyre- he states this happened 3 years ago and was hospitalized at [facility] for catatonia associated with  "depression. Required ECT which was successful. Became unresponsive again 6-7 days ago off and on. She had decreased PO intake. Father states that first episode was triggered by patient's mother's death. He thinks that paternal grandmother's death (3 years ago) and recent move into her grandmother's house due to snakes infesting her trailer (3 months ago). This move made patient depressed according to her father. He also thinks that break up with her boyfriend (3-4 years ago). may have triggered it. Patient says that she has been depressed for "a while" according to her father. She follows with Dr Arenas in [place] and receives meds from [facility] in [place] (not open currently).    Patient given 2mg ativan as per psych recommendations and to watch patient, if no improvement get father consent for ECT. Patient appears to be improving with psych and nursing staff, ativan decreased to 1mg q6hrs. Patient still catatonic for hospital medicine.     Assessment and Plan for Problems addressed today:    Catatonia     - OSH workup including LP, MRI/MRA, CTH negative  - check HIV, RPR, TSH  - vEEG ordered  -Ativan at 1mg q6hrs, if no improvement consider father consent for ECT     Coagulopathy     INR 1.4  May be nutritional vitamin K deficiency given long NPO status  Vitamin K SQ x1             Bacteremia     - 1/2 cultures positive on admit to OSH for gram positive. Notes from OSH state thought to be E. Faecalis. TTE performed and negative there. Repeat blood cultures negative. ?contaminant?  - repeat blood cultures x2  - continue linezolid for now- will need to call OSH for culture results to narrow  - Can discontinue antibiotics            Discharge plan and follow up    Provider    I personally scribed for Mohsen Astorga MD on 08/06/2018 at 10:16 AM. Electronically signed by christian Anderson III on 08/06/2018 at 10:16 AM      "

## 2018-08-06 NOTE — PROGRESS NOTES
"[facility]  Psychiatry  Progress Note    Patient Name: Rupal Mcintyre  MRN: [id number]   Code Status: Full Code  Admission Date: 8/1/2018  Hospital Length of Stay: 5 days  Expected Discharge Date: 8/7/2018  Attending Physician: Mohsen Astorga MD  Primary Care Provider: Primary Doctor No    Current Legal Status: N/A    Patient information was obtained from patient, past medical records and ER records.     Subjective:     Principal Problem:Catatonia    Chief Complaint: "I'm doing alright"    HPI: Rupal Mcintyre is a 28 y.o. female with a history of depression and ADHD who presented to [facility] due to AMS. Psychiatry was consulted to address the patient's symptom of catatonia.    HPI per Primary Team:   Patient experienced an episode of catatonia in 2015 after the death of her mother, which led to a stint in an inpatient psychiatric facility. She was found altered and brought to the ED (nobody can provide collateral details of this, and she has had no family visit or return calls).  Since admit she will only blink and stare ahead.  At admission she was dehydrated with an elevated lactic acid and Na level.  This morning she became slightly more lucid and was alert x3, only to return to her previous catatonic state. At admission her WBC was normal, then merry to 18k, then returned to normal.  She had blood cxs done, which turned positive 1 of 2 with GPC (too early to know if chains or clusters), for which she was started on Vancomyin and Rocephin.  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  The sending MD was unable to perform a LP due to her being given Lovenox for DVT prophylaxis.    Patient is lying in bed with eyes open but does not respond to questions. Called father Kennedy Mcintyre- he states this happened 3 years ago and was hospitalized at [facility] for catatonia associated with depression. Required ECT which was successful. Became unresponsive " "again 6-7 days ago off and on. She had decreased PO intake. Father states that first episode was triggered by patient's mother's death. He thinks that paternal grandmother's death (3 years ago) and recent move into her grandmother's house due to snakes infesting her trailer (3 months ago). This move made patient depressed according to her father. He also thinks that break up with her boyfriend (3-4 years ago). may have triggered it. Patient says that she has been depressed for "a while" according to her father. She follows with Dr Arenas in [place] and receives meds from [facility] pharmacy in [place] (not open currently).      After giving 1mg ativan x1, patient woke up. She was disoriented but then realized she was in [place]. She says she has had "bad depression" but could not elaborate further. She says she takes xanax PRN and no other psych meds. She complains of pain in the bad of her neck.     Psychiatry Consult:  HPI limited due to patient's current condition. Patient demonstrates mutism and stupor with no reactivity to painful stimuli. Patient has a blank stare and only blinks. Upon lifting her arm into the arm, her arm immediately drops down. Per patient's sitter, the patient has remained in the same position for the entire duration that the sitter has been with her (approximately 3 hours). Sitter states that when the patient has been moved to a different position, she will return back to her initial position. The sitter reports that the patient has grunted on a few occasions, but has not spoken at all. Unable to assess for mood, SI, HI, and AVH.    Per the Luther-Alvino Catatonia Rating Scale, the patient is positive for immobility/stupor (3 pts), mutism (3pts), staring (3 pts), and negativism (3pts), which is indicative of catatonia. The patient also failed CAM-ICU (demonstrated AMS, failed SAVEAHAART test for inattention, and demonstrated altered level of consciousness), which is indicative of " delirium.    Per [facility] Chart Review:  [facility] the patient for catatonia on 1/8/2016. The patient was initially started on Ativan 1mg IV TID and Amantadine 100mg NG TID which was ineffective, so consent was obtained from the patient's father for ECT. The patient returned to baseline after two ECT sessions and was started on Celexa 20mg daily for MDD.    Collateral: Kennedy Mcintyre (father) [phone]    Psychiatric Review of Systems-is patient experiencing or having changes in  sleep: unable to assess  appetite: unable to assess  weight: unable to assess  energy/anergy: unable to assess  interest/pleasure/anhedonia: unable to assess  somatic symptoms: unable to assess  libido: unable to assess  anxiety/panic: unable to assess  guilty/hopelessness: unable to assess  concentration: unable to assess  S.I.B.s/risky behavior: unable to assess  any drugs: unable to assess  alcohol: unable to assess     History obtained from chart review and [facility] records due to patient's current condition:     Past Psychiatric History:  Previous Medication Trials: Cymbalta, Celexa, Adderall, Xanax, Ambien   Previous Psychiatric Hospitalizations: no   Previous Suicide Attempts: no   History of Violence: no  Outpatient Psychiatrist: Dr. Cormier in [place] ([phone])    Social History:  Marital Status: not   Children: 0   Employment Status/Info: currently unemployed  Education: 10th grade  Special Ed: no  Housing Status: grandmother's house  History of phys/sexual abuse: no  Access to gun: no    Substance Abuse History:  Recreational Drugs: none  Use of Alcohol: denied  Rehab History:no   Tobacco Use:no  Use of OTC: unknown    Legal History:  Past Charges/Incarcerations:yes, she and boyfriend were arrested for marijuana possession around ~2011   Pending charges:no     Family Psychiatric History:   Brother with depression and ADHD    Psychosocial Stressors: recent move from [facility] to grandmother's  "house  Functioning Relationships: good relationship with father    Hospital Course: 08/03/2018   Chart reviewed. Upon initiation of interview, pt was lying in bed, dressed in hospital gown. No distress noted. No acute events overnight. Patient continues to be nonverbal and not able to follow vocal commands.  When medical student evaluated the patient it was reported that she did have some eye tracking however this is not appreciated on my evaluation. For the entire duration of my evaluation she had no apparent response or interaction with her external environment.      Per chart review there have been periods of improvement of her catatonic symptoms as she has been able to make short vocalizations to nursing and other providers.    08/04/2018  Upon my evaluation patient is lying in bed talking coherently to her brother on the telephone. She acknowledges me as "the doctor" as I enter the room and asks her brother if she can call back. Reports that she is in the hospital for Catatonia and accurately describes the symptoms. She is linear, alert and oriented x 4 with a blunted affect. She accurately describes the television show she is watching. Does not have much of an appetite. Denies SI/HI/AVH    8/6/2018  Chart reviewed. Upon initiation of interview, pt was lying in bed, dressed in hospital gown. No distress noted, patient agreeable and cooperative with interview. IV access was lost. Patient states that she is feeling "so-so" this morning. Patient reports sleeping well, and has a good appetite. No somatic complaints. Tolerating medications without adverse side effects. Denies SI, HI, AVH, delusions, or paranoia. Patient has been compliant with medications. No psychiatric PRNs required. She continues to report that her depression is present, but is not able to really describe symptoms.  Rates it as a 6/10 in overall severity.  She reports that she feels that her catatonic symptoms which she has good insight into have " "continued to wax and wane with periods of time between Ativan dosing that she has reoccurrences of her symptoms.  Discussed with her possible plans of transferring to [facility] for ECT treatments as she has responded well to these treatments in the past.      Interval History: see hospital course    Family History     Problem Relation (Age of Onset)    Arthritis Father    Bipolar disorder Brother    Cancer Mother, Father, Paternal Grandmother    Depression Father    Heart disease Father        Social History Main Topics    Smoking status: Current Some Day Smoker     Packs/day: 0.25    Smokeless tobacco: Not on file    Alcohol use Yes      Comment: occasional    Drug use: No    Sexual activity: Not on file     Psychotherapeutics     Start     Stop Route Frequency Ordered    08/02/18 2100  lorazepam injection 1 mg      -- IV 4 times daily 08/02/18 4865           Review of Systems  Objective:     Vital Signs (Most Recent):  Temp: 98.2 °F (36.8 °C) (08/06/18 0809)  Pulse: 84 (08/06/18 0809)  Resp: 18 (08/06/18 0809)  BP: 117/72 (08/06/18 0809)  SpO2: 98 % (08/06/18 0809) Vital Signs (24h Range):  Temp:  [96.3 °F (35.7 °C)-99 °F (37.2 °C)] 98.2 °F (36.8 °C)  Pulse:  [73-91] 84  Resp:  [18] 18  SpO2:  [96 %-100 %] 98 %  BP: (113-122)/(56-79) 117/72     Height: 5' 4" (162.6 cm)  Weight: 68 kg (149 lb 14.6 oz)  Body mass index is 25.73 kg/m².      Intake/Output Summary (Last 24 hours) at 08/06/18 0925  Last data filed at 08/05/18 2300   Gross per 24 hour   Intake              120 ml   Output                0 ml   Net              120 ml       Physical Exam    Mental Status Exam:  Appearance: age appropriate, mildly disheveled.  Decreased visual scanning of the room and blink rate.  Level of Consciousness: alert  Behavior/Cooperation: normal, friendly and cooperative  Psychomotor: psychomotor retardation   Speech: slowed, increased latency of response, soft volume  Language: english, fluid  Orientation: person, place, " "situation, day of week, month of year, year  Attention Span/Concentration: intact  Memory: Recent and remote intact to interview  Mood: "alright"  Affect: normal and constricted  Thought Process: normal and logical  Associations: no loosening of associations  Thought Content: normal, no suicidality, no homicidality, delusions, or paranoia  Fund of Knowledge: Aware of current events  Insight: fair  Judgment: improving    Significant Labs:   Last 24 Hours:   Recent Lab Results       08/06/18  0509 08/05/18  2051 08/05/18  1748 08/05/18  1222      Immature Granulocytes 0.3        Immature Grans (Abs) 0.03  Comment:  Mild elevation in immature granulocytes is non specific and   can be seen in a variety of conditions including stress response,   acute inflammation, trauma and pregnancy. Correlation with other   laboratory and clinical findings is essential.          Anion Gap 8        Baso # 0.03        Basophil% 0.3        BUN, Bld 4(L)        Calcium 8.9        Chloride 105        CO2 24        Creatinine 0.5        Differential Method Automated        eGFR if  >60.0        eGFR if non  >60.0  Comment:  Calculation used to obtain the estimated glomerular filtration  rate (eGFR) is the CKD-EPI equation.           Eos # 0.1        Eosinophil% 1.3        Glucose 88        Gran # (ANC) 6.0        Gran% 64.5        Hematocrit 37.5        Hemoglobin 12.5        Lymph # 2.2        Lymph% 24.2        MCH 32.0(H)        MCHC 33.3        MCV 96        Mono # 0.9        Mono% 9.4        MPV 10.9        nRBC 0        Platelets 181        POCT Glucose  71 86 96     Potassium 3.6        RBC 3.91(L)        RDW 13.7        Sodium 137        WBC 9.27              Significant Imaging: I have reviewed all pertinent imaging results/findings within the past 24 hours.    Assessment/Plan:     * Catatonia    - Symptoms of catatonia continue to be improved. Patient is alert and oriented x 4, linear, organized " and demonstrates spontaneous speech.   - If patient and family are agreeable will discuss transferring patient with Dr. Maurice Astorga at [facility] for ECT as she is having incomplete treatment of her catatonia.  - Previous catatonic episode successfully treated with ECT at [facility] in January 2016  - Continue Ativan IV 1 mg every 6 hours.  If she develops worsening of her catatonia recommend increasing to 2 mg every six hours.  - Continue Lovenox DVT prophylaxis due to patient's current immobility/stupor  - Continue to monitor for dehydration and malnutrition by monitoring I/O, continuing IVF, and nutrition consult for tube feeds vs TPN/PPN  -         Major depressive disorder, severe    - Previous trials of Cymbalta and Celexa  - Zyvox was stopped yesterday.  Will need to continue to hold antidepressant treatment for 5 days and coordinate with outpatient psychiatrist Dr. Cormier.             Need for Continued Hospitalization:   Requires ongoing hospitalization for stabilization of medications.    Anticipated Disposition: Still a Patient     Total time:  25 with greater than 50% of this time spent in counseling and/or coordination of care.       Ponce Anand MD   Psychiatry  [facility]

## 2018-08-06 NOTE — PLAN OF CARE
Problem: Self-Care Deficit (Adult,Obstetrics,Pediatric)  Intervention: Promote Patient Activity Focusing on Functional [place]   08/04/18 2000 08/05/18 1716   Daily Care Interventions   Self-Care Promotion independence encouraged --    Activity   Activity Management --  activity adjusted per tolerance*   Activity Assistance Provided --  assistance, 1 person

## 2018-08-06 NOTE — NURSING
IV attempted 6 times by 3 different nurses with no success. MD notified. Switched 2100 ativan to PO instead of IV. PICC consult ordered.

## 2018-08-06 NOTE — MEDICAL/APP STUDENT
[facility]  Psychiatry  Progress Note     Patient Name: Rupal Mcintyre  MRN: [id number]   Code Status: Full Code  Admission Date: 8/1/2018  Hospital Length of Stay: 4 days  Expected Discharge Date: 8/7/2018  Attending Physician: Mohsen Astorga MD  Primary Care Provider: Primary Doctor No     Current Legal Status: N/A     Patient information was obtained from patient and ER records.      SUBJECTIVE     Principal Problem: Catatonia     Chief Complaint: Catatonia     HPI: Rupal Mcintyre is a 28 y.o. female with a psychiatric history of depression, ADHD, and catatonia who presented to [facility] as a transfer from [facility] due to AMS. Psychiatry was consulted to address the patient's symptom of catatonia.    Hospital Course: 08/03/2018   Chart reviewed. Upon initiation of interview, pt was lying in bed, dressed in hospital gown. No distress noted. No acute events overnight. Patient continues to be nonverbal and not able to follow vocal commands.  When medical student evaluated the patient it was reported that she did have some eye tracking however this is not appreciated on my evaluation. For the entire duration of my evaluation she had no apparent response or interaction with her external environment.       Per chart review there have been periods of improvement of her catatonic symptoms as she has been able to make short vocalizations to nursing and other providers.     08/04/2018  Upon evaluation patient is lying in bed talking coherently to her brother on the telephone and asks her brother if she can call back. Reports that she is in the hospital for Catatonia and accurately describes the symptoms. She is linear, alert and oriented x 4 with a blunted affect. She accurately describes the television show she is watching. Does not have much of an appetite. Denies SI/HI/AVH    08/05/2018  Pt exhibited periods of being awake and oriented x4 with linear and organized behavior and spontaneous  "speech, and periods of catatonia throughout the shift. While awake, pt was able to ambulate some in room, reports stomach pain and nausea inhibiting her from eating liquid diet. Pt was reportedly anxious and stated to nurse on questioning "I just feel so aggravated."    08/06/2016  This morning on exam, the patient is awake and oriented x4. She displays some slowed response to questioning, and decreased blinking. She reports getting some sleep over night. She reports getting up and walking some in the room yesterday. Pt reports mood is "okay" and denies SI. Pt does not display signs of AMS. Pt is able to move extremities and follow commands.     Psychiatric Review Of Systems - Is patient experiencing or having changes in:  sleep: yes  appetite: yes  weight: no  energy/anergy: yes  interest/pleasure/anhedonia: no  somatic symptoms: no  anxiety/panic: yes  guilty/hopelessness: no  concentration: no  S.I.B.s/risky behavior: no    anxiety/panic: yes  Agoraphobia:  no  Social phobia:  no  Recurrent nightmares:  no  hyper startle response:  no  Avoidance: no  Recurrent thoughts:  yes  Recurrent behaviors:  no    Irritability: no  Racing thoughts: no  Impulsive behaviors: no  Pressured speech:  no    Paranoia:no  Delusions: no  AVH:no    Allergies:  Vancomycin analogues    Past Medical/Surgical History:  Past Medical History:   Diagnosis Date    Catatonia 2015    Depression with prior catatonia    Hypertension     Miscarriage 2012      has a past medical history of Catatonia (2015); Depression (with prior catatonia); Hypertension; and Miscarriage (2012).  History reviewed. No pertinent surgical history.    History obtained from chart review and [facility] records due to patient's current condition:      Past Psychiatric History:  Previous Medication Trials: Cymbalta, Celexa, Adderall, Xanax, Ambien   Previous Psychiatric Hospitalizations: no   Previous Suicide Attempts: no   History of Violence: no  Outpatient Psychiatrist: " Dr. Cormier in [place] ([phone])     Social History:  Marital Status: not   Children: 0   Employment Status/Info: currently unemployed  Education: 10th grade  Special Ed: no  Housing Status: grandmother's house  History of phys/sexual abuse: no  Access to gun: no     Substance Abuse History:  Recreational Drugs: none  Use of Alcohol: denied  Rehab History:no   Tobacco Use:no  Use of OTC: unknown     Legal History:  Past Charges/Incarcerations:yes, she and boyfriend were arrested for marijuana possession around ~2011   Pending charges:no      Family Psychiatric History:   Brother with depression and ADHD     Psychosocial Stressors: recent move from [facility] to grandmother's house  Functioning Relationships: good relationship with father    Scheduled Meds:   enoxaparin  40 mg Subcutaneous Daily    lidocaine  1 patch Transdermal Q24H    lorazepam  1 mg Intravenous QID     ondansetron, sodium chloride 0.9%  Psychotherapeutics     Start     Stop Route Frequency Ordered    08/02/18 2100  lorazepam injection 1 mg      -- IV 4 times daily 08/02/18 1753          PRN Meds:  ondansetron, sodium chloride 0.9%    Home Meds:  Prior to Admission medications    Medication Sig Start Date End Date Taking? Authorizing Provider   ALPRAZolam (XANAX) 1 MG tablet Take 1 mg by mouth 3 (three) times daily.   Yes Historical Provider, MD   zolpidem (AMBIEN) 10 mg Tab Take 5 mg by mouth every evening.   Yes Historical Provider, MD       OBJECTIVE     Vital Signs:  Temp:  [96.3 °F (35.7 °C)-99 °F (37.2 °C)]   Pulse:  [73-91]   Resp:  [18]   BP: (113-122)/(56-79)   SpO2:  [96 %-100 %]        Mental Status Exam:  Appearance: unremarkable, age appropriate, lying in bed, obese  Level of Consciousness: alert, awake  Grooming: appropriate to situation  Psychomotor Behavior: normal, cooperative  Speech: normal tone, volume, delayed response  Language: english, fluid  Orientation: grossly intact  Attention Span/Concentration:  "Decreased  Memory: Intact  Mood: "neutral"  Affect: normal  Thought Process: normal and logical  Associations: normal and logical  Thought Content: normal, no suicidality, no homicidality, delusions, or paranoia  Fund of Knowledge: adequate to education level  Insight: good  Judgment: good    Laboratory Data:  Recent Results (from the past 48 hour(s))   POCT glucose    Collection Time: 08/04/18 12:04 PM   Result Value Ref Range    POCT Glucose 80 70 - 110 mg/dL   POCT glucose    Collection Time: 08/04/18  6:40 PM   Result Value Ref Range    POCT Glucose 85 70 - 110 mg/dL   POCT glucose    Collection Time: 08/05/18 12:50 AM   Result Value Ref Range    POCT Glucose 73 70 - 110 mg/dL   POCT glucose    Collection Time: 08/05/18  1:42 AM   Result Value Ref Range    POCT Glucose 75 70 - 110 mg/dL   POCT glucose    Collection Time: 08/05/18  2:01 AM   Result Value Ref Range    POCT Glucose 97 70 - 110 mg/dL   CBC auto differential    Collection Time: 08/05/18  6:08 AM   Result Value Ref Range    WBC 8.77 3.90 - 12.70 K/uL    RBC 3.92 (L) 4.00 - 5.40 M/uL    Hemoglobin 12.4 12.0 - 16.0 g/dL    Hematocrit 36.8 (L) 37.0 - 48.5 %    MCV 94 82 - 98 fL    MCH 31.6 (H) 27.0 - 31.0 pg    MCHC 33.7 32.0 - 36.0 g/dL    RDW 13.5 11.5 - 14.5 %    Platelets 158 150 - 350 K/uL    MPV 11.6 9.2 - 12.9 fL    Immature Granulocytes 0.3 0.0 - 0.5 %    Gran # (ANC) 5.1 1.8 - 7.7 K/uL    Immature Grans (Abs) 0.03 0.00 - 0.04 K/uL    Lymph # 2.6 1.0 - 4.8 K/uL    Mono # 0.9 0.3 - 1.0 K/uL    Eos # 0.2 0.0 - 0.5 K/uL    Baso # 0.03 0.00 - 0.20 K/uL    nRBC 0 0 /100 WBC    Gran% 58.1 38.0 - 73.0 %    Lymph% 29.3 18.0 - 48.0 %    Mono% 10.1 4.0 - 15.0 %    Eosinophil% 1.9 0.0 - 8.0 %    Basophil% 0.3 0.0 - 1.9 %    Platelet Estimate Appears normal     Aniso Slight     Poik Slight     Poly Occasional     Hypo Occasional     Differential Method Automated    Basic metabolic panel    Collection Time: 08/05/18  6:08 AM   Result Value Ref Range    Sodium " 139 136 - 145 mmol/L    Potassium 4.3 3.5 - 5.1 mmol/L    Chloride 111 (H) 95 - 110 mmol/L    CO2 20 (L) 23 - 29 mmol/L    Glucose 103 70 - 110 mg/dL    BUN, Bld 3 (L) 6 - 20 mg/dL    Creatinine 0.6 0.5 - 1.4 mg/dL    Calcium 9.1 8.7 - 10.5 mg/dL    Anion Gap 8 8 - 16 mmol/L    eGFR if African American >60.0 >60 mL/min/1.73 m^2    eGFR if non African American >60.0 >60 mL/min/1.73 m^2   POCT glucose    Collection Time: 08/05/18  7:24 AM   Result Value Ref Range    POCT Glucose 93 70 - 110 mg/dL   POCT glucose    Collection Time: 08/05/18 12:22 PM   Result Value Ref Range    POCT Glucose 96 70 - 110 mg/dL   POCT glucose    Collection Time: 08/05/18  5:48 PM   Result Value Ref Range    POCT Glucose 86 70 - 110 mg/dL   POCT glucose    Collection Time: 08/05/18  8:51 PM   Result Value Ref Range    POCT Glucose 71 70 - 110 mg/dL   CBC auto differential    Collection Time: 08/06/18  5:09 AM   Result Value Ref Range    WBC 9.27 3.90 - 12.70 K/uL    RBC 3.91 (L) 4.00 - 5.40 M/uL    Hemoglobin 12.5 12.0 - 16.0 g/dL    Hematocrit 37.5 37.0 - 48.5 %    MCV 96 82 - 98 fL    MCH 32.0 (H) 27.0 - 31.0 pg    MCHC 33.3 32.0 - 36.0 g/dL    RDW 13.7 11.5 - 14.5 %    Platelets 181 150 - 350 K/uL    MPV 10.9 9.2 - 12.9 fL    Immature Granulocytes 0.3 0.0 - 0.5 %    Gran # (ANC) 6.0 1.8 - 7.7 K/uL    Immature Grans (Abs) 0.03 0.00 - 0.04 K/uL    Lymph # 2.2 1.0 - 4.8 K/uL    Mono # 0.9 0.3 - 1.0 K/uL    Eos # 0.1 0.0 - 0.5 K/uL    Baso # 0.03 0.00 - 0.20 K/uL    nRBC 0 0 /100 WBC    Gran% 64.5 38.0 - 73.0 %    Lymph% 24.2 18.0 - 48.0 %    Mono% 9.4 4.0 - 15.0 %    Eosinophil% 1.3 0.0 - 8.0 %    Basophil% 0.3 0.0 - 1.9 %    Differential Method Automated    Basic metabolic panel    Collection Time: 08/06/18  5:09 AM   Result Value Ref Range    Sodium 137 136 - 145 mmol/L    Potassium 3.6 3.5 - 5.1 mmol/L    Chloride 105 95 - 110 mmol/L    CO2 24 23 - 29 mmol/L    Glucose 88 70 - 110 mg/dL    BUN, Bld 4 (L) 6 - 20 mg/dL    Creatinine 0.5  0.5 - 1.4 mg/dL    Calcium 8.9 8.7 - 10.5 mg/dL    Anion Gap 8 8 - 16 mmol/L    eGFR if African American >60.0 >60 mL/min/1.73 m^2    eGFR if non African American >60.0 >60 mL/min/1.73 m^2      No results found for: PHENYTOIN, PHENOBARB, VALPROATE, CBMZ  Imaging:  Imaging Results    None          ASSESSMENT     Impression:  Rupal Mcintyre is a 28 y.o. female with a history of ***    RECOMMENDATIONS      1. Scheduled Medication Recommendations:  ***    2. PRN Recommendations:  ***    3.  Monitor:  Please obtain daily EKG to monitor QTc    4. Legal Status/Precautions:  Continue PEC-CEC as pt is gravely disabled and refusing treatment.    5. Capacity:  Pt continues to have waxing and waning of symptoms and continues to refuse treatment at times. Therefore pt currently does NOT have medical decision making capacity due to Delirium. Please contact next of kin for making medical decisions currently.    6. Other:  [facility] ICU- ***  DELIRIUM BEHAVIOR MANAGEMENT  PLEASE utilize CHEMICAL restraints with PRN meds first for agitation. Minimize use of PHYSICAL restraints  Keep window shades open and room lit during day and room dim at night in order to promote normal sleep-wake cycles  Encourage family at bedside. [place] patient often to situation, location, date.  Continue to Limit or Discontinue use of Narcotics, Benzos and Anti-cholinergic medications as they may worsen delirium.  Continue medical workup for causative etiology of Delirium.     Thank you for this consult.  Will continue to follow.    @DDM@

## 2018-08-06 NOTE — PLAN OF CARE
Problem: Patient Care Overview  Goal: Plan of Care Review    Recommendations     Recommendation/Intervention:   1. Order Boost Plus if PO intake does not improve to >50% of meals over next 48 hours.   2. Continue current regular diet and encourage PO intake.   3. RD following.     Goals: Pt meeting >75% EEN and EPN at RD follow up  Nutrition Goal Status: goal not met

## 2018-08-06 NOTE — PROGRESS NOTES
" [facility]  Adult Nutrition  Progress Note    SUMMARY       Recommendations    Recommendation/Intervention:   1. Order Boost Plus if PO intake does not improve to >50% of meals over next 48 hours.   2. Continue current regular diet and encourage PO intake.   3. RD following.    Goals: Pt meeting >75% EEN and EPN at RD follow up  Nutrition Goal Status: goal not met  Communication of RD Recs: other (comment) (care plan )    Reason for Assessment    Reason for Assessment: RD follow-up  Diagnosis: other (see comments) (AMS)  Relevant Medical History: depression, catatonia, HTN    General Information Comments: Pt's diet advanced to regular this am. Breakfast tray at bedside with <25% eaten. Pt alert and answering questions. Reports improving appetite. States appetite was not great at home but denies wt loss. Appears nourished. Agreeable to Boost if appetite does not improve.    Nutrition Discharge Planning: Adequate PO intake    Nutrition Risk Screen    Nutrition Risk Screen: other (see comments) (abby, nonverbal)    Nutrition/Diet History    Patient Reported Diet/Restrictions/Preferences: general  Do you have any cultural, spiritual, Restorationism conflicts, given your current situation?: No cultural/Restorationism preferences noted.  Factors Affecting Nutritional Intake: depression, decreased appetite    Anthropometrics    Temp: 98.4 °F (36.9 °C)  Height Method: Measured  Height: 5' 4" (162.6 cm)  Height (inches): 64 in  Weight Method: Bed Scale  Weight: 68 kg (149 lb 14.6 oz)  Weight (lb): 149.91 lb  Ideal Body Weight (IBW), Female: 120 lb  % Ideal Body Weight, Female (lb): 124.93 lb  BMI (Calculated): 25.8  BMI Grade: 25 - 29.9 - overweight       Lab/Procedures/Meds    Pertinent Labs Reviewed: reviewed  Pertinent Labs Comments: Noted  Pertinent Medications Reviewed: reviewed  Pertinent Medications Comments: IVF    Physical Findings/Assessment    Overall Physical Appearance: nourished  Oral/Mouth Cavity: " WDL  Skin: edema    Estimated/Assessed Needs    Weight Used For Calorie Calculations: 68 kg (149 lb 14.6 oz)  Energy Calorie Requirements (kcal): 1674 (MSJ X 1.2 (PAL) )  Energy Need Method: [place]  Protein Requirements: 55-68 gm (0.8-1.0 gm/kg)  Weight Used For Protein Calculations: 68 kg (149 lb 14.6 oz)  Fluid Requirements (mL): 1 ml/kcal or per MD  Fluid Need Method: RDA Method  RDA Method (mL): 1674       Nutrition Prescription Ordered    Current Diet Order: Regular    Evaluation of Received Nutrient/Fluid Intake    IV Fluid (mL):  (D5W/1/2 NS with KCl @ 125 ml/hr)  % Intake of Estimated Energy Needs: 0 - 25 %  % Meal Intake: 0 - 25 %    Nutrition Risk    Level of Risk/Frequency of Follow-up: high (f/u 2x/wk)     Assessment and Plan    Nutrition Problem  inadequate energy intake      Related to (etiology):   Decreased appetite 2/2 depression/catatonia      Signs and Symptoms (as evidenced by):   Previous clear liquid diet, diet just advanced with poor PO intake     Interventions/Recommendations (treatment strategy):  See recs above.     Nutrition Diagnosis Status:   Continues      Monitor and Evaluation    Food and Nutrient Intake: energy intake, food and beverage intake, enteral nutrition intake, parenteral nutrition intake  Food and Nutrient Adminstration: diet order, enteral and parenteral nutrition administration  Physical Activity and Function: nutrition-related ADLs and IADLs  Anthropometric Measurements: weight, weight change  Biochemical Data, Medical Tests and Procedures: electrolyte and renal panel, gastrointestinal profile, inflammatory profile, glucose/endocrine profile, lipid profile  Nutrition-Focused Physical Findings: overall appearance     Nutrition Follow-Up    RD Follow-up?: Yes

## 2018-08-06 NOTE — MEDICAL/APP STUDENT
Hospital Medicine  Progress note    Team: [facility] Sergey Anderson  Admit Date: 8/1/2018  KRISHNA 8/7/2018  Code status: Full Code    Principal Problem:  Catatonia    Interval hx:Patient conversant this morning and had a liquid diet breakfast. Agreed to regular diet for lunch and will try to eat today. Patient still not moving more than her eyes, mouth and head. Infectious disease states that antibiotics can be discontinued.      ROS   Unable to perform ROS: Mental status change       PEx  Temp:  [96.3 °F (35.7 °C)-99 °F (37.2 °C)]   Pulse:  [73-91]   Resp:  [18]   BP: (113-122)/(56-79)   SpO2:  [96 %-100 %]     Intake/Output Summary (Last 24 hours) at 08/06/18 1014  Last data filed at 08/05/18 2300   Gross per 24 hour   Intake              120 ml   Output                0 ml   Net              120 ml        Constitutional: She appears well-developed and well-nourished. No distress. obese   Cardiovascular: Normal rate, regular rhythm, normal heart sounds and intact distal pulses.  Exam reveals no gallop and no friction rub.    No murmur heard.  Pulmonary/Chest: Effort normal and breath sounds normal. No respiratory distress. She has no wheezes. She has no rales. Room air   Abdominal: Soft. Bowel sounds are normal. She exhibits no distension and no mass. There is no tenderness.  Neurological: She is alert. She has normal reflexes. She displays no atrophy and no tremor. She exhibits normal muscle tone. She displays no seizure activity.   Does not respond to commands. Blinked to confrontation once, but then did not. Slightly molded her hand around mine on the R. No response with the L hand. No response to Babinski   Skin: Skin is warm and dry. No rash noted. She is not diaphoretic. No erythema. No pallor.   Nursing note and vitals reviewed.      Recent Labs  Lab 08/04/18  0517 08/05/18  0608 08/06/18  0509   WBC 9.75 8.77 9.27   HGB 13.0 12.4 12.5   HCT 39.2 36.8* 37.5    158 181       Recent Labs  Lab  08/01/18  2219  08/04/18  0517 08/05/18  0608 08/06/18  0509     < > 140 139 137   K 4.5  < > 4.1 4.3 3.6     < > 109 111* 105   CO2 25  < > 24 20* 24   BUN 5*  < > 2* 3* 4*   CREATININE 0.6  < > 0.5 0.6 0.5     < > 98 103 88   CALCIUM 8.9  < > 9.1 9.1 8.9   MG 2.3  --   --   --   --    PHOS 3.3  --   --   --   --    < > = values in this interval not displayed.    Recent Labs  Lab 08/01/18 2219 08/01/18  2220   ALKPHOS 52*  --    ALT 10  --    AST 24  --    ALBUMIN 3.2*  --    PROT 6.5  --    BILITOT 0.4  --    INR  --  1.4*        Recent Labs  Lab 08/05/18  0142 08/05/18  0201 08/05/18  0724 08/05/18  1222 08/05/18  1748 08/05/18  2051   POCTGLUCOSE 75 97 93 96 86 71     No results for input(s): CPK, CPKMB, MB, TROPONINI in the last 72 hours.    Scheduled Meds:   enoxaparin  40 mg Subcutaneous Daily    lidocaine  1 patch Transdermal Q24H    lorazepam  1 mg Intravenous QID     Continuous Infusions:   dextrose 5 % and 0.45 % NaCl with KCl 20 mEq 125 mL/hr at 08/04/18 2300     As Needed:  ondansetron, sodium chloride 0.9%    Active Hospital Problems    Diagnosis  POA    *Catatonia [F06.1]  Yes    Major depressive disorder, severe [F32.2]  Yes    Bacteremia [R78.81]  Yes    Coagulopathy [D68.9]  Yes      Resolved Hospital Problems    Diagnosis Date Resolved POA   No resolved problems to display.       Overview  Ms Rupal Mcintyre is a 28 y.o. woman with depression and prior catatonia who presents as transfer from [facility] for catatonia nad Neuro/Psych evaluation. Patient is lying in bed with eyes open but does not respond to questions. Called father Kennedy Mcintyre- he states this happened 3 years ago and was hospitalized at [facility] for catatonia associated with depression. Required ECT which was successful. Became unresponsive again 6-7 days ago off and on. She had decreased PO intake. Father states that first episode was triggered by patient's mother's death. He thinks that  "paternal grandmother's death (3 years ago) and recent move into her grandmother's house due to snakes infesting her trailer (3 months ago). This move made patient depressed according to her father. He also thinks that break up with her boyfriend (3-4 years ago). may have triggered it. Patient says that she has been depressed for "a while" according to her father. She follows with Dr Arenas in [place] and receives meds from [email or web address] in [place] (not open currently).    Patient given 2mg ativan as per psych recommendations and to watch patient, if no improvement get father consent for ECT. Patient appears to be improving with psych and nursing staff, ativan decreased to 1mg q6hrs. Patient still catatonic for hospital medicine.     Assessment and Plan for Problems addressed today:    Catatonia     - OSH workup including LP, MRI/MRA, CTH negative  - check HIV, RPR, TSH  - vEEG ordered  -Ativan at 1mg q6hrs, if no improvement consider father consent for ECT     Coagulopathy     INR 1.4  May be nutritional vitamin K deficiency given long NPO status  Vitamin K SQ x1             Bacteremia     - 1/2 cultures positive on admit to OSH for gram positive. Notes from OSH state thought to be E. Faecalis. TTE performed and negative there. Repeat blood cultures negative. ?contaminant?  - repeat blood cultures x2  - continue linezolid for now- will need to call OSH for culture results to narrow  - Can discontinue antibiotics            Discharge plan and follow up    Provider    I personally scribed for Mohsen Astorga MD on 08/06/2018 at 10:16 AM. Electronically signed by christian Anderson III on 08/06/2018 at 10:16 AM      "

## 2018-08-06 NOTE — PLAN OF CARE
Problem: Fall Risk (Adult)  Goal: Absence of Falls  Patient will demonstrate the desired outcomes by discharge/transition of care.    08/06/18 0235   Fall Risk (Adult)   Absence of Falls achieves outcome

## 2018-08-06 NOTE — SUBJECTIVE & OBJECTIVE
"Interval History: see hospital course    Family History     Problem Relation (Age of Onset)    Arthritis Father    Bipolar disorder Brother    Cancer Mother, Father, Paternal Grandmother    Depression Father    Heart disease Father        Social History Main Topics    Smoking status: Current Some Day Smoker     Packs/day: 0.25    Smokeless tobacco: Not on file    Alcohol use Yes      Comment: occasional    Drug use: No    Sexual activity: Not on file     Psychotherapeutics     Start     Stop Route Frequency Ordered    08/02/18 2100  lorazepam injection 1 mg      -- IV 4 times daily 08/02/18 7957           Review of Systems  Objective:     Vital Signs (Most Recent):  Temp: 98.2 °F (36.8 °C) (08/06/18 0809)  Pulse: 84 (08/06/18 0809)  Resp: 18 (08/06/18 0809)  BP: 117/72 (08/06/18 0809)  SpO2: 98 % (08/06/18 0809) Vital Signs (24h Range):  Temp:  [96.3 °F (35.7 °C)-99 °F (37.2 °C)] 98.2 °F (36.8 °C)  Pulse:  [73-91] 84  Resp:  [18] 18  SpO2:  [96 %-100 %] 98 %  BP: (113-122)/(56-79) 117/72     Height: 5' 4" (162.6 cm)  Weight: 68 kg (149 lb 14.6 oz)  Body mass index is 25.73 kg/m².      Intake/Output Summary (Last 24 hours) at 08/06/18 0925  Last data filed at 08/05/18 2300   Gross per 24 hour   Intake              120 ml   Output                0 ml   Net              120 ml       Physical Exam    Mental Status Exam:  Appearance: age appropriate, mildly disheveled.  Decreased visual scanning of the room and blink rate.  Level of Consciousness: alert  Behavior/Cooperation: normal, friendly and cooperative  Psychomotor: psychomotor retardation   Speech: slowed, increased latency of response, soft volume  Language: english, fluid  Orientation: person, place, situation, day of week, month of year, year  Attention Span/Concentration: intact  Memory: Recent and remote intact to interview  Mood: "alright"  Affect: normal and constricted  Thought Process: normal and logical  Associations: no loosening of " associations  Thought Content: normal, no suicidality, no homicidality, delusions, or paranoia  Fund of Knowledge: Aware of current events  Insight: fair  Judgment: improving    Significant Labs:   Last 24 Hours:   Recent Lab Results       08/06/18  0509 08/05/18  2051 08/05/18  1748 08/05/18  1222      Immature Granulocytes 0.3        Immature Grans (Abs) 0.03  Comment:  Mild elevation in immature granulocytes is non specific and   can be seen in a variety of conditions including stress response,   acute inflammation, trauma and pregnancy. Correlation with other   laboratory and clinical findings is essential.          Anion Gap 8        Baso # 0.03        Basophil% 0.3        BUN, Bld 4(L)        Calcium 8.9        Chloride 105        CO2 24        Creatinine 0.5        Differential Method Automated        eGFR if  >60.0        eGFR if non  >60.0  Comment:  Calculation used to obtain the estimated glomerular filtration  rate (eGFR) is the CKD-EPI equation.           Eos # 0.1        Eosinophil% 1.3        Glucose 88        Gran # (ANC) 6.0        Gran% 64.5        Hematocrit 37.5        Hemoglobin 12.5        Lymph # 2.2        Lymph% 24.2        MCH 32.0(H)        MCHC 33.3        MCV 96        Mono # 0.9        Mono% 9.4        MPV 10.9        nRBC 0        Platelets 181        POCT Glucose  71 86 96     Potassium 3.6        RBC 3.91(L)        RDW 13.7        Sodium 137        WBC 9.27              Significant Imaging: I have reviewed all pertinent imaging results/findings within the past 24 hours.

## 2018-08-06 NOTE — NURSING
Pt VS stable throughout shift. No significant shift events. Will continue to monitor appropriately.

## 2018-08-07 LAB
POCT GLUCOSE: 104 MG/DL (ref 70–110)
POCT GLUCOSE: 113 MG/DL (ref 70–110)
POCT GLUCOSE: 140 MG/DL (ref 70–110)
POCT GLUCOSE: 199 MG/DL (ref 70–110)

## 2018-08-07 PROCEDURE — 63600175 PHARM REV CODE 636 W HCPCS: Performed by: HOSPITALIST

## 2018-08-07 PROCEDURE — 95951 PR EEG MONITORING/VIDEORECORD: CPT | Mod: 26,,, | Performed by: PSYCHIATRY & NEUROLOGY

## 2018-08-07 PROCEDURE — 20600001 HC STEP DOWN PRIVATE ROOM

## 2018-08-07 PROCEDURE — 99232 SBSQ HOSP IP/OBS MODERATE 35: CPT | Mod: ,,, | Performed by: PSYCHIATRY & NEUROLOGY

## 2018-08-07 PROCEDURE — 25000003 PHARM REV CODE 250: Performed by: HOSPITALIST

## 2018-08-07 PROCEDURE — 99233 SBSQ HOSP IP/OBS HIGH 50: CPT | Mod: ,,, | Performed by: HOSPITALIST

## 2018-08-07 PROCEDURE — 95951 HC EEG MONITORING/VIDEO RECORD: CPT

## 2018-08-07 PROCEDURE — 63600175 PHARM REV CODE 636 W HCPCS: Performed by: PSYCHIATRY & NEUROLOGY

## 2018-08-07 RX ADMIN — DEXTROSE MONOHYDRATE, SODIUM CHLORIDE, AND POTASSIUM CHLORIDE: 50; 4.5; 1.49 INJECTION, SOLUTION INTRAVENOUS at 08:08

## 2018-08-07 RX ADMIN — LORAZEPAM 1 MG: 2 INJECTION INTRAMUSCULAR; INTRAVENOUS at 01:08

## 2018-08-07 RX ADMIN — LORAZEPAM 1 MG: 2 INJECTION INTRAMUSCULAR; INTRAVENOUS at 08:08

## 2018-08-07 RX ADMIN — ENOXAPARIN SODIUM 40 MG: 100 INJECTION SUBCUTANEOUS at 06:08

## 2018-08-07 RX ADMIN — DEXTROSE MONOHYDRATE, SODIUM CHLORIDE, AND POTASSIUM CHLORIDE: 50; 4.5; 1.49 INJECTION, SOLUTION INTRAVENOUS at 05:08

## 2018-08-07 RX ADMIN — LIDOCAINE 1 PATCH: 50 PATCH TOPICAL at 11:08

## 2018-08-07 RX ADMIN — LIDOCAINE 1 PATCH: 50 PATCH TOPICAL at 12:08

## 2018-08-07 RX ADMIN — LORAZEPAM 1 MG: 2 INJECTION INTRAMUSCULAR; INTRAVENOUS at 06:08

## 2018-08-07 RX ADMIN — DEXTROSE MONOHYDRATE, SODIUM CHLORIDE, AND POTASSIUM CHLORIDE: 50; 4.5; 1.49 INJECTION, SOLUTION INTRAVENOUS at 11:08

## 2018-08-07 RX ADMIN — LORAZEPAM 1 MG: 2 INJECTION INTRAMUSCULAR; INTRAVENOUS at 09:08

## 2018-08-07 NOTE — PROGRESS NOTES
"[facility]  Psychiatry  Progress Note    Patient Name: Rupal Mcintyre  MRN: [id number]   Code Status: Full Code  Admission Date: 8/1/2018  Hospital Length of Stay: 6 days  Expected Discharge Date: 8/8/2018  Attending Physician: Pérez Govea MD  Primary Care Provider: Primary Doctor No    Current Legal Status: N/A    Patient information was obtained from patient, past medical records and ER records.     Subjective:     Principal Problem:Catatonia    Chief Complaint: "I'm alright"    HPI: Rupal Mcintyre is a 28 y.o. female with a history of depression and ADHD who [facility]. Psychiatry was consulted to address the patient's symptom of catatonia.    HPI per Primary Team:   Patient experienced an episode of catatonia in 2015 after the death of her mother, which led to a stint in an inpatient psychiatric facility. She was found altered and brought to the ED (nobody can provide collateral details of this, and she has had no family visit or return calls).  Since admit she will only blink and stare ahead.  At admission she was dehydrated with an elevated lactic acid and Na level.  This morning she became slightly more lucid and was alert x3, only to return to her previous catatonic state. At admission her WBC was normal, then merry to 18k, then returned to normal.  She had blood cxs done, which turned positive 1 of 2 with GPC (too early to know if chains or clusters), for which she was started on Vancomyin and Rocephin.  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  The sending MD was unable to perform a LP due to her being given Lovenox for DVT prophylaxis.    Patient is lying in bed with eyes open but does not respond to questions. Called father Kennedy Mcintyre- he states this happened 3 years ago and was hospitalized at [facility] for catatonia associated with depression. Required ECT which was successful. Became unresponsive " "again 6-7 days ago off and on. She had decreased PO intake. Father states that first episode was triggered by patient's mother's death. He thinks that paternal grandmother's death (3 years ago) and recent move into her grandmother's house due to snakes infesting her trailer (3 months ago). This move made patient depressed according to her father. He also thinks that break up with her boyfriend (3-4 years ago). may have triggered it. Patient says that she has been depressed for "a while" according to her father. She follows with Dr Arenas in [place] and receives meds from [facility] in [place] (not open currently).      After giving 1mg ativan x1, patient woke up. She was disoriented but then realized she was in [place]. She says she has had "bad depression" but could not elaborate further. She says she takes xanax PRN and no other psych meds. She complains of pain in the bad of her neck.     Psychiatry Consult:  HPI limited due to patient's current condition. Patient demonstrates mutism and stupor with no reactivity to painful stimuli. Patient has a blank stare and only blinks. Upon lifting her arm into the arm, her arm immediately drops down. Per patient's sitter, the patient has remained in the same position for the entire duration that the sitter has been with her (approximately 3 hours). Sitter states that when the patient has been moved to a different position, she will return back to her initial position. The sitter reports that the patient has grunted on a few occasions, but has not spoken at all. Unable to assess for mood, SI, HI, and AVH.    Per the Luther-Alvino Catatonia Rating Scale, the patient is positive for immobility/stupor (3 pts), mutism (3pts), staring (3 pts), and negativism (3pts), which is indicative of catatonia. The patient also failed CAM-ICU (demonstrated AMS, failed SAVEAHAART test for inattention, and demonstrated altered level of consciousness), which is indicative of " delirium.    Per [facility] Chart Review:  [facility] evaluated the patient for catatonia on 1/8/2016. The patient was initially started on Ativan 1mg IV TID and Amantadine 100mg NG TID which was ineffective, so consent was obtained from the patient's father for ECT. The patient returned to baseline after two ECT sessions and was started on Celexa 20mg daily for MDD.    Collateral: Kennedy Mcintyre (father) [phone]    Psychiatric Review of Systems-is patient experiencing or having changes in  sleep: unable to assess  appetite: unable to assess  weight: unable to assess  energy/anergy: unable to assess  interest/pleasure/anhedonia: unable to assess  somatic symptoms: unable to assess  libido: unable to assess  anxiety/panic: unable to assess  guilty/hopelessness: unable to assess  concentration: unable to assess  S.I.B.s/risky behavior: unable to assess  any drugs: unable to assess  alcohol: unable to assess     History obtained from chart review and [facility] records due to patient's current condition:     Past Psychiatric History:  Previous Medication Trials: Cymbalta, Celexa, Adderall, Xanax, Ambien   Previous Psychiatric Hospitalizations: no   Previous Suicide Attempts: no   History of Violence: no  Outpatient Psychiatrist: Dr. Cormier in [place] ([phone])    Social History:  Marital Status: not   Children: 0   Employment Status/Info: currently unemployed  Education: 10th grade  Special Ed: no  Housing Status: grandmother's house  History of phys/sexual abuse: no  Access to gun: no    Substance Abuse History:  Recreational Drugs: none  Use of Alcohol: denied  Rehab History:no   Tobacco Use:no  Use of OTC: unknown    Legal History:  Past Charges/Incarcerations:yes, she and boyfriend were arrested for marijuana possession around ~2011   Pending charges:no     Family Psychiatric History:   Brother with depression and ADHD    Psychosocial Stressors: recent move from [facility] to grandmother's  "house  Functioning Relationships: good relationship with father    Hospital Course: 08/03/2018   Chart reviewed. Upon initiation of interview, pt was lying in bed, dressed in hospital gown. No distress noted. No acute events overnight. Patient continues to be nonverbal and not able to follow vocal commands.  When medical student evaluated the patient it was reported that she did have some eye tracking however this is not appreciated on my evaluation. For the entire duration of my evaluation she had no apparent response or interaction with her external environment.      Per chart review there have been periods of improvement of her catatonic symptoms as she has been able to make short vocalizations to nursing and other providers.    08/04/2018  Upon my evaluation patient is lying in bed talking coherently to her brother on the telephone. She acknowledges me as "the doctor" as I enter the room and asks her brother if she can call back. Reports that she is in the hospital for Catatonia and accurately describes the symptoms. She is linear, alert and oriented x 4 with a blunted affect. She accurately describes the television show she is watching. Does not have much of an appetite. Denies SI/HI/AVH    8/6/2018  Chart reviewed. Upon initiation of interview, pt was lying in bed, dressed in hospital gown. No distress noted, patient agreeable and cooperative with interview. IV access was lost. Patient states that she is feeling "so-so" this morning. Patient reports sleeping well, and has a good appetite. No somatic complaints. Tolerating medications without adverse side effects. Denies SI, HI, AVH, delusions, or paranoia. Patient has been compliant with medications. No psychiatric PRNs required. She continues to report that her depression is present, but is not able to really describe symptoms.  Rates it as a 6/10 in overall severity.  She reports that she feels that her catatonic symptoms which she has good insight into have " "continued to wax and wane with periods of time between Ativan dosing that she has reoccurrences of her symptoms.  Discussed with her possible plans of transferring to [facility] for ECT treatments as she has responded well to these treatments in the past.    08/07/2018  Chart reviewed. Upon initiation of interview, pt was lying in bed, dressed in hospital gown. No distress noted, patient agreeable and cooperative with interview. No acute events overnight. Patient states she is feeling "about the same" this morning. Patient reports sleeping well, and has a good appetite. No somatic complaints. Tolerating medications without adverse side effects. Denies SI, HI, AVH, delusions, or paranoia. Patient has been compliant with medications. No psychiatric PRNs required.   She reports that she is continuing to notice that she is not yet back to her baseline.  Appetite had been significantly decrease, but per nursing patient has eaten 100% of the last two meals.  However discussed with patient that she is still not having much limb movement, decreased eye scanning, and limited speech thus ECT is indicated in her case. Discussed plan of proceeding with transfer to [facility] and obtaining ECT treatments.  Patient is agreeable to do so at this time.           Interval History: see hospital course    Family History     Problem Relation (Age of Onset)    Arthritis Father    Bipolar disorder Brother    Cancer Mother, Father, Paternal Grandmother    Depression Father    Heart disease Father        Social History Main Topics    Smoking status: Current Some Day Smoker     Packs/day: 0.25    Smokeless tobacco: Not on file    Alcohol use Yes      Comment: occasional    Drug use: No    Sexual activity: Not on file     Psychotherapeutics     Start     Stop Route Frequency Ordered    08/02/18 2100  lorazepam injection 1 mg      -- IV 4 times daily 08/02/18 0497           Review of Systems  Objective:     Vital Signs (Most Recent):  Temp: 97.7 °F " "(36.5 °C) (08/07/18 0736)  Pulse: 88 (08/07/18 0736)  Resp: 18 (08/07/18 0354)  BP: 120/67 (08/07/18 0736)  SpO2: 99 % (08/07/18 0736) Vital Signs (24h Range):  Temp:  [97.7 °F (36.5 °C)-98.8 °F (37.1 °C)] 97.7 °F (36.5 °C)  Pulse:  [79-96] 88  Resp:  [18] 18  SpO2:  [95 %-99 %] 99 %  BP: (111-121)/(61-74) 120/67     Height: 5' 4" (162.6 cm)  Weight: 68 kg (149 lb 14.6 oz)  Body mass index is 25.73 kg/m².      Intake/Output Summary (Last 24 hours) at 08/07/18 0940  Last data filed at 08/07/18 0600   Gross per 24 hour   Intake              480 ml   Output              100 ml   Net              380 ml       Physical Exam    Appearance: age appropriate, mildly disheveled.  Decreased visual scanning of the room and blink rate.  Level of Consciousness: alert  Behavior/Cooperation: normal, friendly and cooperative  Psychomotor: psychomotor retardation   Speech: slowed, increased latency of response, soft volume  Language: english, fluid  Orientation: person, place, situation, day of week, month of year, year  Attention Span/Concentration: intact  Memory: Recent and remote intact to interview  Mood: "ok"  Affect: normal and constricted  Thought Process: normal and logical  Associations: no loosening of associations  Thought Content: normal, no suicidality, no homicidality, delusions, or paranoia  Fund of Knowledge: Aware of current events  Insight: fair  Judgment: improving    Significant Labs:   Last 24 Hours:   Recent Lab Results       08/07/18  0501 08/07/18  0021 08/06/18  1300      POCT Glucose 113(H) 104 91           Significant Imaging: I have reviewed all pertinent imaging results/findings within the past 24 hours.    Assessment/Plan:     * Catatonia    - Symptoms of catatonia continue to be improved. Patient is alert and oriented x 4, linear, organized and demonstrates spontaneous speech.   - Patient is agreeable to proceeding with ECT treatment which will require her being transferred to [facility].  - Previous " catatonic episode successfully treated with ECT at [facility] in January 2016  - Continue Ativan IV 1 mg every 6 hours.  If she develops worsening of her catatonia recommend increasing to 2 mg every six hours.  - Continue Lovenox DVT prophylaxis due to patient's current immobility/stupor  - Continue to monitor for dehydration and malnutrition by monitoring I/O, continuing IVF, and nutrition consult for tube feeds vs TPN/PPN  - Spoke with Dr. Maurice Astorga at [facility] and he reported that records would need to be sent to [phone] for review.    - Spoke with Dr. Mohsen Astorga who reports that patient is medically stable for transfer and was in agreement with plan to transfer patient. Per discussion with Dr. Astorga I have passed the [facility] number on to CM.        Major depressive disorder, severe    - Previous trials of Cymbalta and Celexa  - Zyvox was stopped 2 days ago.  Will need to continue to hold antidepressant treatment for 5 days and coordinate with outpatient psychiatrist Dr. Cormier.             Need for Continued Hospitalization:   Requires ongoing hospitalization for stabilization of medications.    Anticipated Disposition: Still a Patient     Total time:  25 with greater than 50% of this time spent in counseling and/or coordination of care.       Ponce Anand MD   Psychiatry  [facility]

## 2018-08-07 NOTE — SUBJECTIVE & OBJECTIVE
"Interval History: see hospital course    Family History     Problem Relation (Age of Onset)    Arthritis Father    Bipolar disorder Brother    Cancer Mother, Father, Paternal Grandmother    Depression Father    Heart disease Father        Social History Main Topics    Smoking status: Current Some Day Smoker     Packs/day: 0.25    Smokeless tobacco: Not on file    Alcohol use Yes      Comment: occasional    Drug use: No    Sexual activity: Not on file     Psychotherapeutics     Start     Stop Route Frequency Ordered    08/02/18 2100  lorazepam injection 1 mg      -- IV 4 times daily 08/02/18 0681           Review of Systems  Objective:     Vital Signs (Most Recent):  Temp: 97.7 °F (36.5 °C) (08/07/18 0736)  Pulse: 88 (08/07/18 0736)  Resp: 18 (08/07/18 0354)  BP: 120/67 (08/07/18 0736)  SpO2: 99 % (08/07/18 0736) Vital Signs (24h Range):  Temp:  [97.7 °F (36.5 °C)-98.8 °F (37.1 °C)] 97.7 °F (36.5 °C)  Pulse:  [79-96] 88  Resp:  [18] 18  SpO2:  [95 %-99 %] 99 %  BP: (111-121)/(61-74) 120/67     Height: 5' 4" (162.6 cm)  Weight: 68 kg (149 lb 14.6 oz)  Body mass index is 25.73 kg/m².      Intake/Output Summary (Last 24 hours) at 08/07/18 0940  Last data filed at 08/07/18 0600   Gross per 24 hour   Intake              480 ml   Output              100 ml   Net              380 ml       Physical Exam    Appearance: age appropriate, mildly disheveled.  Decreased visual scanning of the room and blink rate.  Level of Consciousness: alert  Behavior/Cooperation: normal, friendly and cooperative  Psychomotor: psychomotor retardation   Speech: slowed, increased latency of response, soft volume  Language: english, fluid  Orientation: person, place, situation, day of week, month of year, year  Attention Span/Concentration: intact  Memory: Recent and remote intact to interview  Mood: "ok"  Affect: normal and constricted  Thought Process: normal and logical  Associations: no loosening of associations  Thought Content: normal, " no suicidality, no homicidality, delusions, or paranoia  Fund of Knowledge: Aware of current events  Insight: fair  Judgment: improving    Significant Labs:   Last 24 Hours:   Recent Lab Results       08/07/18  0501 08/07/18  0021 08/06/18  1300      POCT Glucose 113(H) 104 91           Significant Imaging: I have reviewed all pertinent imaging results/findings within the past 24 hours.

## 2018-08-07 NOTE — PLAN OF CARE
Problem: Pressure Ulcer Risk (Refugio Scale) (Adult,Obstetrics,Pediatric)  Intervention: Maintain Head of Bed Elevation Less Than 30 Degrees as Tolerated   08/07/18 0200   Positioning   Head of Bed (HOB) HOB at 30-45 degrees

## 2018-08-07 NOTE — PLAN OF CARE
Discharge planning: Called transfer center and spoke with Ivis and requested initiation of transfer to [facility].

## 2018-08-08 VITALS
DIASTOLIC BLOOD PRESSURE: 75 MMHG | HEIGHT: 64 IN | WEIGHT: 149.94 LBS | RESPIRATION RATE: 18 BRPM | BODY MASS INDEX: 25.6 KG/M2 | HEART RATE: 86 BPM | OXYGEN SATURATION: 98 % | SYSTOLIC BLOOD PRESSURE: 125 MMHG | TEMPERATURE: 98 F

## 2018-08-08 PROBLEM — R41.82 ALTERED MENTAL STATE: Status: RESOLVED | Noted: 2018-08-08 | Resolved: 2018-08-08

## 2018-08-08 PROBLEM — D68.9 COAGULOPATHY: Status: RESOLVED | Noted: 2018-08-01 | Resolved: 2018-08-08

## 2018-08-08 PROBLEM — R78.81 BACTEREMIA: Status: RESOLVED | Noted: 2018-08-01 | Resolved: 2018-08-08

## 2018-08-08 LAB
ALBUMIN SERPL BCP-MCNC: 2.8 G/DL
ALP SERPL-CCNC: 52 U/L
ALT SERPL W/O P-5'-P-CCNC: 23 U/L
ANION GAP SERPL CALC-SCNC: 8 MMOL/L
AST SERPL-CCNC: 34 U/L
BASOPHILS # BLD AUTO: 0.08 K/UL
BASOPHILS NFR BLD: 0.9 %
BILIRUB SERPL-MCNC: 0.2 MG/DL
BUN SERPL-MCNC: 3 MG/DL
CALCIUM SERPL-MCNC: 8.8 MG/DL
CHLORIDE SERPL-SCNC: 108 MMOL/L
CO2 SERPL-SCNC: 21 MMOL/L
CREAT SERPL-MCNC: 0.6 MG/DL
DIFFERENTIAL METHOD: ABNORMAL
EOSINOPHIL # BLD AUTO: 0.4 K/UL
EOSINOPHIL NFR BLD: 5 %
ERYTHROCYTE [DISTWIDTH] IN BLOOD BY AUTOMATED COUNT: 13.9 %
EST. GFR  (AFRICAN AMERICAN): >60 ML/MIN/1.73 M^2
EST. GFR  (NON AFRICAN AMERICAN): >60 ML/MIN/1.73 M^2
GLUCOSE SERPL-MCNC: 94 MG/DL
HCT VFR BLD AUTO: 36.8 %
HGB BLD-MCNC: 12.1 G/DL
IMM GRANULOCYTES # BLD AUTO: 0.18 K/UL
IMM GRANULOCYTES NFR BLD AUTO: 2.1 %
LYMPHOCYTES # BLD AUTO: 3.1 K/UL
LYMPHOCYTES NFR BLD: 36.9 %
MCH RBC QN AUTO: 31.3 PG
MCHC RBC AUTO-ENTMCNC: 32.9 G/DL
MCV RBC AUTO: 95 FL
MONOCYTES # BLD AUTO: 1.2 K/UL
MONOCYTES NFR BLD: 14.5 %
NEUTROPHILS # BLD AUTO: 3.4 K/UL
NEUTROPHILS NFR BLD: 40.6 %
NRBC BLD-RTO: 0 /100 WBC
PLATELET # BLD AUTO: 172 K/UL
PMV BLD AUTO: 10.5 FL
POCT GLUCOSE: 104 MG/DL (ref 70–110)
POCT GLUCOSE: 121 MG/DL (ref 70–110)
POTASSIUM SERPL-SCNC: 5 MMOL/L
PROT SERPL-MCNC: 6 G/DL
RBC # BLD AUTO: 3.86 M/UL
SODIUM SERPL-SCNC: 137 MMOL/L
WBC # BLD AUTO: 8.43 K/UL

## 2018-08-08 PROCEDURE — 80053 COMPREHEN METABOLIC PANEL: CPT

## 2018-08-08 PROCEDURE — 99239 HOSP IP/OBS DSCHRG MGMT >30: CPT | Mod: ,,, | Performed by: HOSPITALIST

## 2018-08-08 PROCEDURE — 25000003 PHARM REV CODE 250: Performed by: HOSPITALIST

## 2018-08-08 PROCEDURE — 95951 HC EEG MONITORING/VIDEO RECORD: CPT

## 2018-08-08 PROCEDURE — 36415 COLL VENOUS BLD VENIPUNCTURE: CPT

## 2018-08-08 PROCEDURE — 63600175 PHARM REV CODE 636 W HCPCS: Performed by: HOSPITALIST

## 2018-08-08 PROCEDURE — 85025 COMPLETE CBC W/AUTO DIFF WBC: CPT

## 2018-08-08 PROCEDURE — 63600175 PHARM REV CODE 636 W HCPCS: Performed by: PSYCHIATRY & NEUROLOGY

## 2018-08-08 RX ORDER — ENOXAPARIN SODIUM 100 MG/ML
40 INJECTION SUBCUTANEOUS DAILY
Start: 2018-08-08

## 2018-08-08 RX ORDER — DEXTROSE MONOHYDRATE, SODIUM CHLORIDE, AND POTASSIUM CHLORIDE 50; 1.49; 4.5 G/1000ML; G/1000ML; G/1000ML
125 INJECTION, SOLUTION INTRAVENOUS CONTINUOUS
Start: 2018-08-08

## 2018-08-08 RX ADMIN — ENOXAPARIN SODIUM 40 MG: 100 INJECTION SUBCUTANEOUS at 06:08

## 2018-08-08 RX ADMIN — LORAZEPAM 1 MG: 2 INJECTION INTRAMUSCULAR; INTRAVENOUS at 05:08

## 2018-08-08 RX ADMIN — LORAZEPAM 1 MG: 2 INJECTION INTRAMUSCULAR; INTRAVENOUS at 10:08

## 2018-08-08 RX ADMIN — LORAZEPAM 1 MG: 2 INJECTION INTRAMUSCULAR; INTRAVENOUS at 08:08

## 2018-08-08 RX ADMIN — LORAZEPAM 1 MG: 2 INJECTION INTRAMUSCULAR; INTRAVENOUS at 03:08

## 2018-08-08 RX ADMIN — DEXTROSE MONOHYDRATE, SODIUM CHLORIDE, AND POTASSIUM CHLORIDE: 50; 4.5; 1.49 INJECTION, SOLUTION INTRAVENOUS at 03:08

## 2018-08-08 RX ADMIN — DEXTROSE MONOHYDRATE, SODIUM CHLORIDE, AND POTASSIUM CHLORIDE 125 ML/HR: 50; 4.5; 1.49 INJECTION, SOLUTION INTRAVENOUS at 04:08

## 2018-08-08 NOTE — MEDICAL/APP STUDENT
"Discharge Summary  Hospital Medicine    Attending Provider on Discharge: Pérez Govea MD  Hospital Medicine Team: [facility] HOSP MED B  Date of Admission:  8/1/2018     Date of Discharge:    Code status: Full Code    Active Hospital Problems    Diagnosis  POA    *Catatonia [F06.1]  Yes    Altered mental state [R41.82]  Unknown    Major depressive disorder, severe [F32.2]  Yes    Bacteremia [R78.81]  Yes    Coagulopathy [D68.9]  Yes      Resolved Hospital Problems    Diagnosis Date Resolved POA   No resolved problems to display.        HPI  Ms Rupal Mcintyre is a 28 y.o. woman with depression and prior catatonia who presents as transfer from [facility] for catatonia nad Neuro/Psych evaluation. Patient is lying in bed with eyes open but does not respond to questions. Called father Kennedy Mcintyre- he states this happened 3 years ago and was hospitalized at [facility] for catatonia associated with depression. Required ECT which was successful. Became unresponsive again 6-7 days ago off and on. She had decreased PO intake. Father states that first episode was triggered by patient's mother's death. He thinks that paternal grandmother's death (3 years ago) and recent move into her grandmother's house due to snakes infesting her trailer (3 months ago). This move made patient depressed according to her father. He also thinks that break up with her boyfriend (3-4 years ago). may have triggered it. Patient says that she has been depressed for "a while" according to her father. She follows with Dr Arenas in [place] and receives meds from [facility] in [place] (not open currently).    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  -8/2 - psychiatry increases ativan (catatonia), neurology agreed  -8/3 - ativan dose reduced, pt eating again after not doing so for 1 wk, walking around room  -8/4 - back to " catatonic state   -8/5 - mentation improving  -8/6 - ID discontinues antibiotics, pt still not moving more than eyes, mouth, head  -8/7 - Able to converse, eating more, vEEG started, ECT resolves catatonia (2 sessions), celexa started for depression  -8/8 - Discharge    Recent Labs  Lab 08/05/18  0608 08/06/18  0509 08/08/18  0828   WBC 8.77 9.27 8.43   HGB 12.4 12.5 12.1   HCT 36.8* 37.5 36.8*    181 172       Recent Labs  Lab 08/01/18 2219 08/05/18  0608 08/06/18  0509 08/08/18  0828     < > 139 137 137   K 4.5  < > 4.3 3.6 5.0     < > 111* 105 108   CO2 25  < > 20* 24 21*   BUN 5*  < > 3* 4* 3*   CREATININE 0.6  < > 0.6 0.5 0.6     < > 103 88 94   CALCIUM 8.9  < > 9.1 8.9 8.8   MG 2.3  --   --   --   --    PHOS 3.3  --   --   --   --    < > = values in this interval not displayed.    Recent Labs  Lab 08/01/18 2219 08/01/18  2220 08/08/18  0828   ALKPHOS 52*  --  52*   ALT 10  --  23   AST 24  --  34   ALBUMIN 3.2*  --  2.8*   PROT 6.5  --  6.0   BILITOT 0.4  --  0.2   INR  --  1.4*  --         Recent Labs  Lab 08/07/18  0021 08/07/18  0501 08/07/18  1757 08/07/18  2308 08/08/18  0503 08/08/18  1128   POCTGLUCOSE 104 113* 140* 199* 104 121*     No results for input(s): CPK, CPKMB, MB, TROPONINI in the last 72 hours.    No results for input(s): LACTATE in the last 72 hours.     Procedures: none    Consultants:  psychiatry, Dr Tanvir Carlos, catatonia and MDD  neurology, Dr. Savanna Gonzalez, catatonia  Infectious disease, Dr. Cecilia Brown, bacteremia    Current Discharge Medication List      CONTINUE these medications which have NOT CHANGED    Details   ALPRAZolam (XANAX) 1 MG tablet Take 1 mg by mouth 3 (three) times daily.      zolpidem (AMBIEN) 10 mg Tab Take 5 mg by mouth every evening.           Discharge Diet:regular diet with Normal Fluid intake of 1500 - 2000 mL per day    Activity: activity as tolerated    Discharge Condition: Good    Disposition: Home or Self Care    Tests  pending at the time of discharge: none     Time spent  on the discharge of the patient including review of hospital course with the patient. reviewing discharge medications and arranging follow-up care     Discharge examination Patient was seen and examined on the date of discharge and determined to be suitable for discharge.    Discharge plan and follow up:    Catatonia  -transfer to [facility] for ETC treatments    Major Depressive Disorder   -previous trials of cymbalta and celexa. Continue holding for 5 days given recent linezolid, we will coordinate with Dr. Cormier (outpt psychiatrist) regarding restarting antidepressants    Future Appointments  Date Time Provider Department Center   8/9/2018 9:45 AM NEURODIAGNOSTICS, APPT [facility] Jerald Miller       Provider    I personally scribed for Pérez Govea MD on 08/08/2018 at 3:50 PM. Electronically signed by christian Morales on 08/08/2018 at 3:50 PM

## 2018-08-08 NOTE — PROGRESS NOTES
[facility]  Psychiatry  Progress Note    Patient Name: Rupal Mcintyre  MRN: [id number]   Code Status: Full Code  Admission Date: 8/1/2018  Hospital Length of Stay: 7 days  Expected Discharge Date: 8/8/2018  Attending Physician: Pérez Govea MD  Primary Care Provider: Primary Doctor No    Current Legal Status: N/A    Patient information was obtained from patient, past medical records and ER records.     Subjective:     Principal Problem:Catatonia    HPI: Rupal Mcintyre is a 28 y.o. female with a history of depression and ADHD who presented to [facility] due to AMS. Psychiatry was consulted to address the patient's symptom of catatonia.    HPI per Primary Team:   Patient experienced an episode of catatonia in 2015 after the death of her mother, which led to a stint in an inpatient psychiatric facility. She was found altered and brought to the ED (nobody can provide collateral details of this, and she has had no family visit or return calls).  Since admit she will only blink and stare ahead.  At admission she was dehydrated with an elevated lactic acid and Na level.  This morning she became slightly more lucid and was alert x3, only to return to her previous catatonic state. At admission her WBC was normal, then merry to 18k, then returned to normal.  She had blood cxs done, which turned positive 1 of 2 with GPC (too early to know if chains or clusters), for which she was started on Vancomyin and Rocephin.  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  The sending MD was unable to perform a LP due to her being given Lovenox for DVT prophylaxis.    Patient is lying in bed with eyes open but does not respond to questions. Called father Kennedy Mcintyre- he states this happened 3 years ago and was hospitalized at [facility] for catatonia associated with depression. Required ECT which was successful. Became unresponsive again 6-7 days ago off and on. She  "had decreased PO intake. Father states that first episode was triggered by patient's mother's death. He thinks that paternal grandmother's death (3 years ago) and recent move into her grandmother's house due to snakes infesting her trailer (3 months ago). This move made patient depressed according to her father. He also thinks that break up with her boyfriend (3-4 years ago). may have triggered it. Patient says that she has been depressed for "a while" according to her father. She follows with Dr Arenas in [place] and receives meds from [facility] in [place] (not open currently).      After giving 1mg ativan x1, patient woke up. She was disoriented but then realized she was in [place]. She says she has had "bad depression" but could not elaborate further. She says she takes xanax PRN and no other psych meds. She complains of pain in the bad of her neck.     Psychiatry Consult:  HPI limited due to patient's current condition. Patient demonstrates mutism and stupor with no reactivity to painful stimuli. Patient has a blank stare and only blinks. Upon lifting her arm into the arm, her arm immediately drops down. Per patient's sitter, the patient has remained in the same position for the entire duration that the sitter has been with her (approximately 3 hours). Sitter states that when the patient has been moved to a different position, she will return back to her initial position. The sitter reports that the patient has grunted on a few occasions, but has not spoken at all. Unable to assess for mood, SI, HI, and AVH.    Per the Luther-Alvino Catatonia Rating Scale, the patient is positive for immobility/stupor (3 pts), mutism (3pts), staring (3 pts), and negativism (3pts), which is indicative of catatonia. The patient also failed CAM-ICU (demonstrated AMS, failed SAVEAHAART test for inattention, and demonstrated altered level of consciousness), which is indicative of delirium.    Per [facility] Chart " Review:  [facility] evaluated the patient for catatonia on 1/8/2016. The patient was initially started on Ativan 1mg IV TID and Amantadine 100mg NG TID which was ineffective, so consent was obtained from the patient's father for ECT. The patient returned to baseline after two ECT sessions and was started on Celexa 20mg daily for MDD.    Collateral: Kennedy Mcintyre (father) [phone]    Psychiatric Review of Systems-is patient experiencing or having changes in  sleep: unable to assess  appetite: unable to assess  weight: unable to assess  energy/anergy: unable to assess  interest/pleasure/anhedonia: unable to assess  somatic symptoms: unable to assess  libido: unable to assess  anxiety/panic: unable to assess  guilty/hopelessness: unable to assess  concentration: unable to assess  S.I.B.s/risky behavior: unable to assess  any drugs: unable to assess  alcohol: unable to assess     History obtained from chart review and [facility] records due to patient's current condition:     Past Psychiatric History:  Previous Medication Trials: Cymbalta, Celexa, Adderall, Xanax, Ambien   Previous Psychiatric Hospitalizations: no   Previous Suicide Attempts: no   History of Violence: no  Outpatient Psychiatrist: Dr. Cormier in [place] ([phone])    Social History:  Marital Status: not   Children: 0   Employment Status/Info: currently unemployed  Education: 10th grade  Special Ed: no  Housing Status: grandmother's house  History of phys/sexual abuse: no  Access to gun: no    Substance Abuse History:  Recreational Drugs: none  Use of Alcohol: denied  Rehab History:no   Tobacco Use:no  Use of OTC: unknown    Legal History:  Past Charges/Incarcerations:yes, she and boyfriend were arrested for marijuana possession around ~2011   Pending charges:no     Family Psychiatric History:   Brother with depression and ADHD    Psychosocial Stressors: recent move from trailer to grandmother's house  Functioning Relationships: good  "relationship with father    Hospital Course: 08/03/2018   Chart reviewed. Upon initiation of interview, pt was lying in bed, dressed in hospital gown. No distress noted. No acute events overnight. Patient continues to be nonverbal and not able to follow vocal commands.  When medical student evaluated the patient it was reported that she did have some eye tracking however this is not appreciated on my evaluation. For the entire duration of my evaluation she had no apparent response or interaction with her external environment.      Per chart review there have been periods of improvement of her catatonic symptoms as she has been able to make short vocalizations to nursing and other providers.    08/04/2018  Upon my evaluation patient is lying in bed talking coherently to her brother on the telephone. She acknowledges me as "the doctor" as I enter the room and asks her brother if she can call back. Reports that she is in the hospital for Catatonia and accurately describes the symptoms. She is linear, alert and oriented x 4 with a blunted affect. She accurately describes the television show she is watching. Does not have much of an appetite. Denies SI/HI/AVH    8/6/2018  Chart reviewed. Upon initiation of interview, pt was lying in bed, dressed in hospital gown. No distress noted, patient agreeable and cooperative with interview. IV access was lost. Patient states that she is feeling "so-so" this morning. Patient reports sleeping well, and has a good appetite. No somatic complaints. Tolerating medications without adverse side effects. Denies SI, HI, AVH, delusions, or paranoia. Patient has been compliant with medications. No psychiatric PRNs required. She continues to report that her depression is present, but is not able to really describe symptoms.  Rates it as a 6/10 in overall severity.  She reports that she feels that her catatonic symptoms which she has good insight into have continued to wax and wane with periods " "of time between Ativan dosing that she has reoccurrences of her symptoms.  Discussed with her possible plans of transferring to [facility] for ECT treatments as she has responded well to these treatments in the past.    08/07/2018  Chart reviewed. Upon initiation of interview, pt was lying in bed, dressed in hospital gown. No distress noted, patient agreeable and cooperative with interview. No acute events overnight. Patient states she is feeling "about the same" this morning. Patient reports sleeping well, and has a good appetite. No somatic complaints. Tolerating medications without adverse side effects. Denies SI, HI, AVH, delusions, or paranoia. Patient has been compliant with medications. No psychiatric PRNs required.   She reports that she is continuing to notice that she is not yet back to her baseline.  Appetite had been significantly decrease, but per nursing patient has eaten 100% of the last two meals.  However discussed with patient that she is still not having much limb movement, decreased eye scanning, and limited speech thus ECT is indicated in her case. Discussed plan of proceeding with transfer to [facility] and obtaining ECT treatments.  Patient is agreeable to do so at this time.       08/08/2018  Chart reviewed. Upon initiation of interview, pt was lying in bed, dressed in hospital gown. No distress noted, patient agreeable and cooperative with interview. No acute events overnight. Patient states she is feeling "ok" this morning. Patient reports sleeping well, and has a good appetite. No somatic complaints. Tolerating medications without adverse side effects. Reports depression is stable. Denies SI, HI, AVH, delusions, or paranoia. Patient has been compliant with medications. No psychiatric PRNs required.  She is continuing to have waxing and waning symptoms of catatonia, mainly in psychomotor slowing.  Eye movement is slightly improved.  Still agreeable to transfer to [facility] for ECT.        Interval History: per " "hospital    Family History     Problem Relation (Age of Onset)    Arthritis Father    Bipolar disorder Brother    Cancer Mother, Father, Paternal Grandmother    Depression Father    Heart disease Father        Social History Main Topics    Smoking status: Current Some Day Smoker     Packs/day: 0.25    Smokeless tobacco: Not on file    Alcohol use Yes      Comment: occasional    Drug use: No    Sexual activity: Not on file     Psychotherapeutics     Start     Stop Route Frequency Ordered    08/02/18 2100  lorazepam injection 1 mg      -- IV 4 times daily 08/02/18 9963           Review of Systems   Constitutional: Positive for activity change.     Objective:     Vital Signs (Most Recent):  Temp: 97.7 °F (36.5 °C) (08/08/18 0734)  Pulse: 91 (08/08/18 0734)  Resp: 12 (08/08/18 0734)  BP: 115/71 (08/08/18 0734)  SpO2: 96 % (08/08/18 0734) Vital Signs (24h Range):  Temp:  [97.7 °F (36.5 °C)-98.7 °F (37.1 °C)] 97.7 °F (36.5 °C)  Pulse:  [] 91  Resp:  [12-18] 12  SpO2:  [96 %-100 %] 96 %  BP: (106-121)/(57-77) 115/71     Height: 5' 4" (162.6 cm)  Weight: 68 kg (149 lb 14.6 oz)  Body mass index is 25.73 kg/m².      Intake/Output Summary (Last 24 hours) at 08/08/18 0911  Last data filed at 08/07/18 1200   Gross per 24 hour   Intake              875 ml   Output                0 ml   Net              875 ml       Physical Exam      Appearance: age appropriate, mildly disheveled.  Decreased visual scanning of the room and blink rate.  Level of Consciousness: alert  Behavior/Cooperation: normal, friendly and cooperative  Psychomotor: psychomotor retardation   Speech: slowed, increased latency of response, soft volume  Language: english, fluid  Orientation: person, place, situation, day of week, month of year, year  Attention Span/Concentration: intact  Memory: Recent and remote intact to interview  Mood: "ok"  Affect: normal and constricted  Thought Process: normal and logical  Associations: no loosening of " associations  Thought Content: normal, no suicidality, no homicidality, delusions, or paranoia  Fund of Knowledge: Aware of current events  Insight: fair  Judgment: improving    Significant Labs:   Last 24 Hours:   Recent Lab Results       08/08/18  0828 08/08/18  0503 08/07/18  2308 08/07/18  1757      Immature Granulocytes 2.1(H)        Immature Grans (Abs) 0.18  Comment:  Mild elevation in immature granulocytes is non specific and   can be seen in a variety of conditions including stress response,   acute inflammation, trauma and pregnancy. Correlation with other   laboratory and clinical findings is essential.  (H)        Baso # 0.08        Basophil% 0.9        Differential Method Automated        Eos # 0.4        Eosinophil% 5.0        Gran # (ANC) 3.4        Gran% 40.6        Hematocrit 36.8(L)        Hemoglobin 12.1        Lymph # 3.1        Lymph% 36.9        MCH 31.3(H)        MCHC 32.9        MCV 95        Mono # 1.2(H)        Mono% 14.5        MPV 10.5        nRBC 0        Platelets 172        POCT Glucose  104 199(H) 140(H)     RBC 3.86(L)        RDW 13.9        WBC 8.43              Significant Imaging: None  24 hr EEG is still ongoing    Assessment/Plan:     * Catatonia    - Symptoms of catatonia continue to be improved. Patient is alert and oriented x 4, linear, organized and demonstrates spontaneous speech.   - Patient is agreeable to proceeding with ECT treatment which will require her being transferred to [facility].  - Previous catatonic episode successfully treated with ECT at [facility] in January 2016  - Continue Ativan IV 1 mg every 6 hours.  If she develops worsening of her catatonia recommend increasing to 2 mg every six hours. Can convert this to PO as patient is tolerating PO diet.  - Continue Lovenox DVT prophylaxis due to patient's current immobility/stupor  - Continue to monitor for dehydration and malnutrition by monitoring I/O patient is tolerating PO  - Spoke with nursing manager at [facility] and they  report verbal acceptance of patient. Will fill out FVA if required for patient transfer.    - Spoke with Dr. Mohsen Astorga who reports that patient is medically stable for transfer and was in agreement with plan to transfer patient. Per discussion with Dr. Astorga I have passed the [facility] number on to CM.        Major depressive disorder, severe    - Previous trials of Cymbalta and Celexa  - Zyvox was stopped 2 days ago.  Will need to continue to hold antidepressant treatment for 5 days and coordinate with outpatient psychiatrist Dr. Cormier.             Need for Continued Hospitalization:   Requires ongoing hospitalization for stabilization of medications.    Anticipated Disposition: Still a Patient     Total time:  25 with greater than 50% of this time spent in counseling and/or coordination of care.       Ponce Anand MD   Psychiatry  [facility]

## 2018-08-08 NOTE — PLAN OF CARE
Called transfer Center and was informed that they are working on [facility] transfer and will call CM back with more info when available.

## 2018-08-08 NOTE — PLAN OF CARE
Received call from transfer center and bed is available at [facility].room 2231B. Dr. Govea informed. He will discharge patient after speaking with consultants. Transportation to be set up when patient is discharged with [facility] x [phone]. Call SW on call if assistance needed.   Report to be called to Chiara at [facility] [phone].  Communicated this information with patient and nurse.    Copy of chart at .

## 2018-08-08 NOTE — PROGRESS NOTES
Hospital Medicine  Progress note     Team: [facility] Pérez Govea MD  Admit Date: 8/1/2018  KRISHNA 8/7/2018  Code status: Full Code     Principal Problem:  Catatonia     Interval hx:   Doing better, eating more, almost finished breakfast. vEEG started today. Able to converse now. Psych consult confirms catatonia and delirium (failed CAM-ICU, SAVEAHAART).  for transfer to [facility].     Review of Systems   Psychiatric/Behavioral: Positive for depression. Negative for suicidal ideas.      PEx  Temp:  [97.9 °F (36.6 °C)-98.8 °F (37.1 °C)]   Pulse:  [79-96]   Resp:  [18]   BP: (111-121)/(61-74)   SpO2:  [95 %-99 %]      Intake/Output Summary (Last 24 hours) at 08/07/18 0639  Last data filed at 08/07/18 0600    Gross per 24 hour   Intake              530 ml   Output              100 ml   Net              430 ml      General Appearance: no acute distress   Heart: regular rate and rhythm  Respiratory: Normal respiratory effort, no crackles   Abdomen: Soft, non-tender; bowel sounds active  Skin: intact. IV sites ok  Neurologic:  Some catatonia, but can move upper body (follows commands), No focal numbness or weakness, 5/5 strength & tone lower extremities  Mental status: Alert, oriented x 3 affect appropriate, able to carry on conversation     Recent Labs  Lab 08/04/18  0517 08/05/18  0608 08/06/18  0509   WBC 9.75 8.77 9.27   HGB 13.0 12.4 12.5   HCT 39.2 36.8* 37.5    158 181         Recent Labs  Lab 08/01/18  2219   08/04/18  0517 08/05/18  0608 08/06/18  0509     < > 140 139 137   K 4.5  < > 4.1 4.3 3.6     < > 109 111* 105   CO2 25  < > 24 20* 24   BUN 5*  < > 2* 3* 4*   CREATININE 0.6  < > 0.5 0.6 0.5     < > 98 103 88   CALCIUM 8.9  < > 9.1 9.1 8.9   MG 2.3  --   --   --   --    PHOS 3.3  --   --   --   --    < > = values in this interval not displayed.     Recent Labs  Lab 08/01/18  2219 08/01/18  2220   ALKPHOS 52*  --    ALT 10  --    AST 24  --    ALBUMIN 3.2*  --   "  PROT 6.5  --    BILITOT 0.4  --    INR  --  1.4*         Recent Labs  Lab 08/05/18  1222 08/05/18  1748 08/05/18  2051 08/06/18  1300 08/07/18  0021 08/07/18  0501   POCTGLUCOSE 96 86 71 91 104 113*      No results for input(s): CPK, CPKMB, MB, TROPONINI in the last 72 hours.     Scheduled Meds:   enoxaparin  40 mg Subcutaneous Daily    lidocaine  1 patch Transdermal Q24H    lorazepam  1 mg Intravenous QID      Continuous Infusions:   dextrose 5 % and 0.45 % NaCl with KCl 20 mEq 125 mL/hr at 08/07/18 0500      As Needed:  ondansetron, sodium chloride 0.9%           Active Hospital Problems     Diagnosis   POA    *Catatonia [F06.1]   Yes    Major depressive disorder, severe [F32.2]   Yes    Bacteremia [R78.81]   Yes    Coagulopathy [D68.9]   Yes       Resolved Hospital Problems     Diagnosis Date Resolved POA   No resolved problems to display.         Overview  Ms Rupal Mcintyre is a 28 y.o. woman with depression and prior catatonia who presents as transfer from [facility] for catatonia nad Neuro/Psych evaluation. Patient is lying in bed with eyes open but does not respond to questions. Called father Kennedy Mcintyre- he states this happened 3 years ago and was hospitalized at [facility] for catatonia associated with depression. Required ECT which was successful. Became unresponsive again 6-7 days ago off and on. She had decreased PO intake. Father states that first episode was triggered by patient's mother's death. He thinks that paternal grandmother's death (3 years ago) and recent move into her grandmother's house due to snakes infesting her trailer (3 months ago). This move made patient depressed according to her father. He also thinks that break up with her boyfriend (3-4 years ago). may have triggered it. Patient says that she has been depressed for "a while" according to her father. She follows with Dr Arenas in [place] and receives meds from [facility] in [place] (not open " currently).     Patient given 2mg ativan as per psych recommendations and to watch patient, if no improvement get father consent for ECT. Patient appears to be improving with psych and nursing staff, ativan decreased to 1mg q6hrs. Patient still catatonic for hospital medicine.      Assessment and Plan for Problems addressed today:           Catatonia     HIV (-), RPR (non-reactive), TSH wnl, OSH workup including LP, MRI/MRA, CTH negative. Trialed ativan and amantadine, but ineffective, so tried ECT.  -vEEG started today  -Psych consult affirms catatonia and delirium (failed CAM-ICU, SAVEAHAART).  -ECT (father consented, returned to baseline after 2 sessions)  -currently ativan 1mg Q6hrs, if catatonia worsens, increase to 2 mg Q6hrs      Major Depressive Disorder   Per psych, previously trialed cymbalta and celexa. Zyvox stopped 8/5 (2 days ago). Hold for 5 days and coordinate w/ outpt psychiatrist Dr. Cormier. Hospitalization still needed to stabilize meds.  -Celexa 20 mg QD (started after successful ECT)           Coagulopathy     INR 1.4  May be nutritional vitamin K deficiency given long NPO status  Vitamin K SQ x1             Bacteremia     - 1/2 cultures positive on admit to OSH for gram positive. Notes from OSH state thought to be E. Faecalis. TTE performed and negative there. Repeat blood cultures negative. ?contaminant?  - repeat blood cultures x2  - off inezolid for now- will need to call OSH for culture results to narrow  - Can discontinue antibiotics         Discharge plan and follow up     Provider     I personally scribed for Pérez Govea MD on 08/07/2018 at 6:39 AM. Electronically signed by christian Morales on 08/07/2018 at 6:39 AM  The documentation recorded by the scribe accurately reflects service I personally performed and the decisions made by me.  Pérez Govea MD  Attending Staff Physician  [facility] Medicine  pager- [phone]  Spectraljzg - [phone]

## 2018-08-08 NOTE — SUBJECTIVE & OBJECTIVE
"Interval History: per hospital    Family History     Problem Relation (Age of Onset)    Arthritis Father    Bipolar disorder Brother    Cancer Mother, Father, Paternal Grandmother    Depression Father    Heart disease Father        Social History Main Topics    Smoking status: Current Some Day Smoker     Packs/day: 0.25    Smokeless tobacco: Not on file    Alcohol use Yes      Comment: occasional    Drug use: No    Sexual activity: Not on file     Psychotherapeutics     Start     Stop Route Frequency Ordered    08/02/18 2100  lorazepam injection 1 mg      -- IV 4 times daily 08/02/18 3048           Review of Systems   Constitutional: Positive for activity change.     Objective:     Vital Signs (Most Recent):  Temp: 97.7 °F (36.5 °C) (08/08/18 0734)  Pulse: 91 (08/08/18 0734)  Resp: 12 (08/08/18 0734)  BP: 115/71 (08/08/18 0734)  SpO2: 96 % (08/08/18 0734) Vital Signs (24h Range):  Temp:  [97.7 °F (36.5 °C)-98.7 °F (37.1 °C)] 97.7 °F (36.5 °C)  Pulse:  [] 91  Resp:  [12-18] 12  SpO2:  [96 %-100 %] 96 %  BP: (106-121)/(57-77) 115/71     Height: 5' 4" (162.6 cm)  Weight: 68 kg (149 lb 14.6 oz)  Body mass index is 25.73 kg/m².      Intake/Output Summary (Last 24 hours) at 08/08/18 0911  Last data filed at 08/07/18 1200   Gross per 24 hour   Intake              875 ml   Output                0 ml   Net              875 ml       Physical Exam      Appearance: age appropriate, mildly disheveled.  Decreased visual scanning of the room and blink rate.  Level of Consciousness: alert  Behavior/Cooperation: normal, friendly and cooperative  Psychomotor: psychomotor retardation   Speech: slowed, increased latency of response, soft volume  Language: english, fluid  Orientation: person, place, situation, day of week, month of year, year  Attention Span/Concentration: intact  Memory: Recent and remote intact to interview  Mood: "ok"  Affect: normal and constricted  Thought Process: normal and logical  Associations: no " loosening of associations  Thought Content: normal, no suicidality, no homicidality, delusions, or paranoia  Fund of Knowledge: Aware of current events  Insight: fair  Judgment: improving    Significant Labs:   Last 24 Hours:   Recent Lab Results       08/08/18  0828 08/08/18  0503 08/07/18  2308 08/07/18  1757      Immature Granulocytes 2.1(H)        Immature Grans (Abs) 0.18  Comment:  Mild elevation in immature granulocytes is non specific and   can be seen in a variety of conditions including stress response,   acute inflammation, trauma and pregnancy. Correlation with other   laboratory and clinical findings is essential.  (H)        Baso # 0.08        Basophil% 0.9        Differential Method Automated        Eos # 0.4        Eosinophil% 5.0        Gran # (ANC) 3.4        Gran% 40.6        Hematocrit 36.8(L)        Hemoglobin 12.1        Lymph # 3.1        Lymph% 36.9        MCH 31.3(H)        MCHC 32.9        MCV 95        Mono # 1.2(H)        Mono% 14.5        MPV 10.5        nRBC 0        Platelets 172        POCT Glucose  104 199(H) 140(H)     RBC 3.86(L)        RDW 13.9        WBC 8.43              Significant Imaging: None  24 hr EEG is still ongoing

## 2018-08-09 NOTE — PROCEDURES
DATE OF SERVICE:  08/07/2018 and 08/08/2018.    ICU EEG/VIDEO MONITORING REPORT     METHODOLOGY:  Electroencephalographic (EEG) is recorded with electrodes placed   according to the International 10-20 placement system.  Thirty two (32) channels   of digital signal (sampling rate of 512/sec), including T1 and T2, were   simultaneously recorded from the scalp and may include EKG, EMG, and/or eye   monitors.  Recording band pass was 0.1 to 512 Hz.  Digital video recording of   the patient is simultaneously recorded with the EEG.  The patient is instructed   to report clinical symptoms which may occur during the recording session.  EEG   and video recording are stored and archived in digital format.  Activation   procedures, which include photic stimulation, hyperventilation and instructing   patients to perform simple tasks, are done in selected patients.    The EEG is displayed on a monitor screen and can be reviewed using different   montages.  Computer-assisted analysis is employed to detect spike and   electrographic seizure activity.   The entire record is submitted for computer   analysis.  The entire recording is visually reviewed, and the times identified   by computer analysis as being spikes or seizures are reviewed again.      Compressed spectral analysis (CSA) is also performed on the activity recorded   from each individual channel.  This is displayed as a power display of   frequencies from 0 to 30 Hz over time.   The CSA is reviewed looking for   asymmetries in power between homologous areas of the scalp, then compared with   the original EEG recording.    Crew software was also utilized in the review of this study.  This software   suite analyzes the EEG recording in multiple domains.  Coherence and rhythmicity   are computed to identify EEG sections which may contain organized seizures.    Each channel undergoes analysis to detect the presence of spike and sharp waves   which have special and  morphological characteristics of epileptic activity.  The   routine EEG recording is converted from special into frequency domain.  This is   then displayed comparing homologous areas to identify areas of significant   asymmetry.  Algorithm to identify non-cortically generated artifact is used to   separate artifact from the EEG.      RECORDING TIMES:  Total duration will be 18 hours and 17 minutes.  The study   begins on 08/07/2018 at 09:19 and pauses on 08/07/2018 at 18:54.  The study   resumes on 08/08/2018 at 08:33 and concludes on 08/08/2018 at 17:27.    EEG FINDINGS:  This record contains medium amplitude, well-formed background of   mixed frequencies of mostly alpha and beta activity with a well-formed 10 Hz   posterior dominant rhythm, best seen in the occipital channels.  There are   appropriate movement and eye blink artifact seen throughout the records during   wakefulness.  Sleep is captured on multiple occasions characterized by sleep   spindles, K complexes as well as some diffuse beta frequencies and attenuation   of most of the alpha activity.  Good quality long portions of stage N2 and even   N3 sleep are captured.    No epileptiform discharges are seen at any point during this study.  No   electrographic seizures are seen.  No asymmetries or focal findings are seen at   any point.  No marked clinical events were captured.    INTERPRETATION:  Normal extended awake and asleep long-term EEG.      NBB/HN  dd: 08/09/2018 13:22:27 (CDT)  td: 08/09/2018 13:36:29 (CDT)  Doc ID   #[id number]  Job ID #[id number]    CC:

## 2022-10-28 ENCOUNTER — HOSPITAL ENCOUNTER (EMERGENCY)
Facility: HOSPITAL | Age: 32
Discharge: HOME | End: 2022-10-28
Attending: EMERGENCY MEDICINE
Payer: MEDICAID

## 2022-10-28 VITALS
HEART RATE: 88 BPM | TEMPERATURE: 98 F | SYSTOLIC BLOOD PRESSURE: 130 MMHG | OXYGEN SATURATION: 99 % | DIASTOLIC BLOOD PRESSURE: 89 MMHG | RESPIRATION RATE: 14 BRPM

## 2022-10-28 DIAGNOSIS — Z71.1 MENTAL HEALTH-RELATED COMPLAINT: Primary | ICD-10-CM

## 2022-10-28 DIAGNOSIS — F19.10 POLYSUBSTANCE ABUSE: ICD-10-CM

## 2022-10-28 LAB
AMPHET+METHAMPHET UR QL: ABNORMAL
BACTERIA #/AREA URNS HPF: NEGATIVE /HPF
BARBITURATES UR QL SCN>200 NG/ML: NEGATIVE
BENZODIAZ UR QL SCN>200 NG/ML: ABNORMAL
BILIRUB UR QL STRIP: NEGATIVE
BZE UR QL SCN: NEGATIVE
CANNABINOIDS UR QL SCN: ABNORMAL
CLARITY UR: CLEAR
COLOR UR: YELLOW
CREAT UR-MCNC: 49.3 MG/DL (ref 15–325)
GLUCOSE UR QL STRIP: NEGATIVE
HGB UR QL STRIP: NEGATIVE
HYALINE CASTS #/AREA URNS LPF: 1.6 /LPF
KETONES UR QL STRIP: NEGATIVE
LEUKOCYTE ESTERASE UR QL STRIP: ABNORMAL
METHADONE UR QL SCN>300 NG/ML: NEGATIVE
MICROSCOPIC COMMENT: ABNORMAL
NITRITE UR QL STRIP: NEGATIVE
OPIATES UR QL SCN: NEGATIVE
PCP UR QL SCN>25 NG/ML: NEGATIVE
PH UR STRIP: 7 [PH] (ref 5–8)
PROT UR QL STRIP: NEGATIVE
RBC #/AREA URNS HPF: 2 /HPF (ref 0–4)
SP GR UR STRIP: 1.01 (ref 1–1.03)
SQUAMOUS #/AREA URNS HPF: 5 /HPF
TOXICOLOGY INFORMATION: ABNORMAL
URN SPEC COLLECT METH UR: ABNORMAL
UROBILINOGEN UR STRIP-ACNC: 1 EU/DL
WBC #/AREA URNS HPF: 6 /HPF (ref 0–5)

## 2022-10-28 PROCEDURE — 99283 EMERGENCY DEPT VISIT LOW MDM: CPT | Mod: 25

## 2022-10-28 PROCEDURE — 80307 DRUG TEST PRSMV CHEM ANLYZR: CPT | Performed by: EMERGENCY MEDICINE

## 2022-10-28 PROCEDURE — 81000 URINALYSIS NONAUTO W/SCOPE: CPT | Mod: 59 | Performed by: EMERGENCY MEDICINE

## 2022-10-28 NOTE — ED PROVIDER NOTES
"EMERGENCY DEPARTMENT HISTORY AND PHYSICAL EXAM     This note is dictated on M*Modal word recognition program.  There are word recognition mistakes and grammatical errors that are occasionally missed on review.     Date: 10/28/2022   Patient Name: Ruapl Mcintyre       History of Presenting Illness           Chief Complaint   Patient presents with    Mental Health Problem     EMS states pt was found knocking on windows at a motel offering her services to people. The pt is worried someone is going to hurt her brother bc she can "feel his feelings" per pt. Pt denies SI or HI. Pt denies drug use but states she took 100 mg gabapentin's today. Pt also c/o nausea.          History Provided By: Patient and EMS       Rupal Mcintyre is a 32 y.o. female with PMHX of and MDD with catatonia status post ECT who presents to the emergency department C/O bizarre behavior.    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  Patient also reported to 1st responders that she could feel what her brother was feeling and that she was concerned for his well-being.      In ED patient reports urinary pain.  She denies suicidal homicidal ideation.  She denies drug use.  She reportedly took 400 mg of gabapentin today.  She reports some nausea.  She denies psychiatric crisis.  She tells me she lives with her father and that we can call him.    PCP: Primary Doctor No        No current facility-administered medications for this encounter.     Current Outpatient Medications   Medication Sig Dispense Refill    enoxaparin (LOVENOX) 40 mg/0.4 mL Syrg Inject 0.4 mLs (40 mg total) into the skin once daily.      potassium chloride/D5-0.45NaCl (DEXTROSE 5 % AND 0.45 % NACL WITH KCL 20 MEQ) 20 mEq/L Inject 125 mL/hr into the vein continuous.             Past History     Past Medical History:   Past Medical History:   Diagnosis Date    Catatonia 2015    Depression with prior catatonia    Hypertension     " Miscarriage 2012        Past Surgical History:   History reviewed. No pertinent surgical history.     Family History:   Family History   Problem Relation Age of Onset    Cancer Mother         colon    Cancer Father         prostate    Heart disease Father     Arthritis Father     Depression Father     Bipolar disorder Brother     Cancer Paternal Grandmother         bone        Social History:   Social History     Tobacco Use    Smoking status: Some Days     Packs/day: 0.25     Types: Cigarettes   Substance Use Topics    Alcohol use: Yes     Comment: occasional    Drug use: No        Allergies:   Review of patient's allergies indicates:   Allergen Reactions    Vancomycin analogues Rash     Red man syndrome          Review of Systems   Review of Systems   Constitutional:  Negative for fever.   HENT:  Negative for sore throat.    Respiratory:  Negative for shortness of breath.    Cardiovascular:  Negative for chest pain.   Gastrointestinal:  Positive for nausea. Negative for vomiting.   Genitourinary:  Positive for dysuria and urgency.   Musculoskeletal:  Negative for back pain.   Skin:  Negative for rash.   Neurological:  Negative for weakness.   Hematological:  Does not bruise/bleed easily.   Psychiatric/Behavioral:  Negative for dysphoric mood, hallucinations and suicidal ideas.               Physical Exam     Vitals:    10/28/22 1334 10/28/22 1521   BP: (!) 121/97 (!) 131/95   BP Location: Right arm Right arm   Patient Position: Sitting Sitting   Pulse: 82 89   Resp: 16    Temp: 98.4 °F (36.9 °C)    TempSrc: Oral    SpO2: 99%       Physical Exam  Vitals and nursing note reviewed.   Constitutional:       General: She is not in acute distress.     Appearance: Normal appearance. She is not ill-appearing.   HENT:      Head: Normocephalic and atraumatic.      Right Ear: External ear normal.      Left Ear: External ear normal.      Nose: Nose normal. No congestion or rhinorrhea.      Mouth/Throat:      Mouth: Mucous  membranes are moist.      Comments: Poor dentition  Eyes:      Conjunctiva/sclera: Conjunctivae normal.      Pupils: Pupils are equal, round, and reactive to light.   Cardiovascular:      Rate and Rhythm: Normal rate.      Pulses: Normal pulses.      Heart sounds: Normal heart sounds.   Pulmonary:      Effort: Pulmonary effort is normal. No respiratory distress.      Breath sounds: Normal breath sounds.   Abdominal:      Palpations: Abdomen is soft.   Musculoskeletal:         General: No deformity. Normal range of motion.      Cervical back: Normal range of motion. No rigidity.      Right lower leg: No edema.      Left lower leg: No edema.   Skin:     General: Skin is dry.   Neurological:      General: No focal deficit present.      Mental Status: She is alert and oriented to person, place, and time. Mental status is at baseline.   Psychiatric:         Attention and Perception: Attention and perception normal.         Mood and Affect: Mood and affect normal.         Speech: Speech is slurred.         Behavior: Behavior is cooperative.         Thought Content: Thought content does not include homicidal or suicidal ideation. Thought content does not include homicidal or suicidal plan.            Diagnostic Study Results      Labs -   Recent Results (from the past 12 hour(s))   Urinalysis, Reflex to Urine Culture Urine, Clean Catch    Collection Time: 10/28/22  2:32 PM    Specimen: Urine, Clean Catch   Result Value Ref Range    Specimen UA Urine, Clean Catch     Color, UA Yellow Yellow, Straw, Rylie    Appearance, UA Clear Clear    pH, UA 7.0 5.0 - 8.0    Specific Gravity, UA 1.010 1.005 - 1.030    Protein, UA Negative Negative    Glucose, UA Negative Negative    Ketones, UA Negative Negative    Bilirubin (UA) Negative Negative    Occult Blood UA Negative Negative    Nitrite, UA Negative Negative    Urobilinogen, UA 1.0 <2.0 EU/dL    Leukocytes, UA Trace (A) Negative   Urinalysis Microscopic    Collection Time:  10/28/22  2:32 PM   Result Value Ref Range    RBC, UA 2 0 - 4 /hpf    WBC, UA 6 (H) 0 - 5 /hpf    Bacteria Negative None-Occ /hpf    Squam Epithel, UA 5 /hpf    Hyaline Casts, UA 1.6 (A) 0-1/lpf /lpf    Microscopic Comment SEE COMMENT    Drug screen panel, in-house    Collection Time: 10/28/22  2:33 PM   Result Value Ref Range    Benzodiazepines Presumptive Positive (A) Negative    Methadone metabolites Negative Negative    Cocaine (Metab.) Negative Negative    Opiate Scrn, Ur Negative Negative    Barbiturate Screen, Ur Negative Negative    Amphetamine Screen, Ur Presumptive Positive (A) Negative    THC Presumptive Positive (A) Negative    Phencyclidine Negative Negative    Creatinine, Urine 49.3 15.0 - 325.0 mg/dL    Toxicology Information SEE COMMENT         Radiologic Studies -    No orders to display        Medications given in the ED-   Medications - No data to display        Medical Decision Making    I am the first provider for this patient.     I reviewed the vital signs, available nursing notes, past medical history, past surgical history, family history and social history.     Vital Signs:  Reviewed the patient's vital signs.     Pulse Oximetry Analysis and Interpretation:    99% on Room Air, normal      Records Reviewed: Nursing notes.        Provider Notes (Medical Decision Making): Rupal Mcintyre is a 32 y.o. female here after reported bizarre behavior at [facility].  Patient was reportedly offering sexual Services.  In the ED she reports urinary symptoms.  Denies drug use.  Denies suicidal ideation.  She is somewhat bizarre.  She does not meet criteria for pec based on my evaluation at this time.  She is requesting discharge.  Will discuss with patient's father for safe discharge planning.      Procedures:   Procedures      ED Course:      Urinalysis unremarkable.  UDS positive for benzos, amphetamines, THC  Patient appears to be mentating at her baseline.  Denies suicidal or homicidal ideation.          Diagnosis and Disposition     Critical Care:      DISCHARGE NOTE:       Rupal Mcintyre's  results have been reviewed with her.  She has been counseled regarding her diagnosis, treatment, and plan.  She verbally conveys understanding and agreement of the signs, symptoms, diagnosis, treatment and prognosis and additionally agrees to follow up as discussed.  She also agrees with the care-plan and conveys that all of her questions have been answered.  I have also provided discharge instructions for her that include: educational information regarding their diagnosis and treatment, and list of reasons why they would want to return to the ED prior to their follow-up appointment, should her condition change. She has been provided with education for proper emergency department utilization.         CLINICAL IMPRESSION:         1. Mental health-related complaint    2. Polysubstance abuse              PLAN:   1. Discharge Home  2.      Medication List        ASK your doctor about these medications      dextrose 5 % and 0.45 % NaCl with KCl 20 mEq 20 mEq/L  Inject 125 mL/hr into the vein continuous.     enoxaparin 40 mg/0.4 mL Syrg  Commonly known as: LOVENOX  Inject 0.4 mLs (40 mg total) into the skin once daily.             3. [facility]  [address] [id number] [phone]  Schedule an appointment as soon as possible for a visit in 1 day      [facility] Emergency Department  [address] [address] [phone]  Go to   If symptoms worsen     _______________________________     Please note that this dictation was completed with [employer], the computer voice recognition software.  Quite often unanticipated grammatical, syntax, homophones, and other interpretive errors are inadvertently transcribed by the computer software.  Please disregard these errors.  Please excuse any errors that have escaped final proofreading.             Rodo Pickering,  MD  10/28/22 9787

## 2024-01-31 NOTE — PLAN OF CARE
Called transfer center to get update on transfer status. [facility] will call transfer center when they have a discharge and bed becomes available.   Medication: amlodipine-benazepril (LOTREL) 5-40 MG per capsule  Robyn Montiel MD  Last office visit date: 1.6.22 Robyn Montiel MD  Medication Refill Protocol Failed.  Normal eGFR within last 12 months looking at last value

## 2025-07-22 NOTE — ASSESSMENT & PLAN NOTE
28 year old female with history of depression and catatonia transferred to [facility] for psych evaluation of catatonic symptoms. ID consulted for E. Faecalis bacteremia treatment recommendations. Patient was admitted to [facility] on 7/30 with AMS and catatonia. During her admission her WBC count trended up and a blood culture set drawn returned positive for Enterococcus per primary team notes. She was started on empiric antibiotics and is currently on Zyvox alone. Repeat blood cultures are negative. Leukocytosis resolved. She has been afebrile with stable vitals. Unresponsive at time of visit but non septic appearing.     Recommendations  - Unclear significance of positive blood culture set at OSH. Suspect this is possibly a contaminant as all repeat cultures are negative. She is afebrile without systemic evidence of infection. She has completed about 6 days of antibiotic therapy. Currently on Zyvox which has known to have drug interactions with psychiatric medications. At this time would discontinue Zyvox and monitor patient off of antibiotics. If she develops signs of infection or if there is any infectious concerns, please re-consult ID. ID will sign off.  
- 1/2 cultures positive on admit to OSH for gram positive. Notes from OSH state thought to be E. Faecalis. TTE performed and negative there. Repeat blood cultures negative. ?contaminant?  - repeat blood cultures x2  - continue linezolid for now- will need to call OSH for culture results to narrow    
- OSH workup including LP, MRI/MRA, CTH negative  - check HIV, RPR, TSH  - vEEG ordered  - not malignant- no fever, no rigidity, no autonomic instability  - start ativan 1mg TID. Patient woke up after receiving first dose. May need to increase or patient may need ECT again  - Psych and Neuro consults  - Nutrition consult for tube feeding/PPN/TPN recs since patient has not been eating for almost 1 week  - start IVF now and clear liquids for when patient is awake    
- Per primary team   - Currently on Linezolid  - No sign of infection and only 1 positive culture at the OSH   
- Per the Luther-Alvino Catatonia Rating Scale, the patient is positive for immobility/stupor (3 pts), mutism (3pts), staring (3 pts), and negativism (3pts), indicative of retarded catatonia  - Previous catatonic episode successfully treated with ECT at [facility] in January 2016  - Increase Ativan IV to 2 mg PO every 6 hours.  - If no improvement with Ativan, would try to obtain consent from patient's father for ECT as she has shown good response to this treatment in the past. ECT will require transfer to [facility]. Will discuss transferring patient with Dr. Maurice Astorga at [facility]. Literature review indicates no contraindications to ECT while on linezolid if transfer is required prior to completion of her abx course.   - Continue Lovenox DVT prophylaxis due to patient's current immobility/stupor  - Continue to monitor for dehydration and malnutrition by monitoring I/O, continuing IVF, and nutrition consult for tube feeds vs TPN/PPN  -   
- Previous trials of Cymbalta and Celexa  - Consider addition of antidepressant after Zyvox treatment has been completed and coordinate with outpatient psychiatrist Dr. Cormier  
- Previous trials of Cymbalta and Celexa  - Zyvox was stopped 2 days ago.  Will need to continue to hold antidepressant treatment for 5 days and coordinate with outpatient psychiatrist Dr. Cormier.  
- Previous trials of Cymbalta and Celexa  - Zyvox was stopped 3 days ago.  Will need to continue to hold antidepressant treatment for 5 days and coordinate with outpatient psychiatrist Dr. Cormier.  
- Previous trials of Cymbalta and Celexa  - Zyvox was stopped yesterday.  Will need to continue to hold antidepressant treatment for 5 days and coordinate with outpatient psychiatrist Dr. Cormier.  
- Psychiatry consulted  - Patient does not appear to be on medication at home currently   
- Symptoms of catatonia continue to be improved. Patient is alert and oriented x 4, linear, organized and demonstrates spontaneous speech.   - If patient and family are agreeable will discuss transferring patient with Dr. Maurice Astorga at [facility] for ECT as she is having incomplete treatment of her catatonia.  - Previous catatonic episode successfully treated with ECT at [facility] in January 2016  - Continue Ativan IV 1 mg every 6 hours.  If she develops worsening of her catatonia recommend increasing to 2 mg every six hours.  - Continue Lovenox DVT prophylaxis due to patient's current immobility/stupor  - Continue to monitor for dehydration and malnutrition by monitoring I/O, continuing IVF, and nutrition consult for tube feeds vs TPN/PPN  -   
- Symptoms of catatonia continue to be improved. Patient is alert and oriented x 4, linear, organized and demonstrates spontaneous speech.   - Patient is agreeable to proceeding with ECT treatment which will require her being transferred to [facility].  - Previous catatonic episode successfully treated with ECT at [facility] in January 2016  - Continue Ativan IV 1 mg every 6 hours.  If she develops worsening of her catatonia recommend increasing to 2 mg every six hours. Can convert this to PO as patient is tolerating PO diet.  - Continue Lovenox DVT prophylaxis due to patient's current immobility/stupor  - Continue to monitor for dehydration and malnutrition by monitoring I/O patient is tolerating PO  - Spoke with nursing manager at [facility] and they report verbal acceptance of patient. Will fill out FVA if required for patient transfer.    - Spoke with Dr. Mohsen Astorga who reports that patient is medically stable for transfer and was in agreement with plan to transfer patient. Per discussion with Dr. Astorga I have passed the [facility] number on to CM.  
- Symptoms of catatonia continue to be improved. Patient is alert and oriented x 4, linear, organized and demonstrates spontaneous speech.   - Patient is agreeable to proceeding with ECT treatment which will require her being transferred to [facility].  - Previous catatonic episode successfully treated with ECT at [facility] in January 2016  - Continue Ativan IV 1 mg every 6 hours.  If she develops worsening of her catatonia recommend increasing to 2 mg every six hours.  - Continue Lovenox DVT prophylaxis due to patient's current immobility/stupor  - Continue to monitor for dehydration and malnutrition by monitoring I/O, continuing IVF, and nutrition consult for tube feeds vs TPN/PPN  - Spoke with Dr. Maurice Astorga at [facility] and he reported that records would need to be sent to [phone] for review.    - Spoke with Dr. Mohsen Astorga who reports that patient is medically stable for transfer and was in agreement with plan to transfer patient. Per discussion with Dr. Astorga I have passed the [facility] number on to CM.  
- Symptoms of catatonia much improved today. Patient is alert and oriented x 4, linear, organized and demonstrates spontaneous speech.  - Previous catatonic episode successfully treated with ECT at [facility] in January 2016  - Continue Ativan IV 1 mg every 6 hours.  If she develops worsening of her catatonia recommend increasing to 2 mg every six hours.  - If improvement is not sustained on Ativan, would try to obtain consent from patient's father for ECT as she has shown good response to this treatment in the past. ECT will require transfer to [facility]. Will discuss transferring patient with Dr. Maurice Astorga at [facility]. Literature review indicates no contraindications to ECT while on linezolid if transfer is required prior to completion of her abx course.   - Continue Lovenox DVT prophylaxis due to patient's current immobility/stupor  - Continue to monitor for dehydration and malnutrition by monitoring I/O, continuing IVF, and nutrition consult for tube feeds vs TPN/PPN  -   
29 yo female w/ Hs depression and tHx of catatonia which responded well to ECT in 2015 at [facility] now presents w/ similar symptoms as a transfer for Psychiatry and Neurology evaluation    - Patient catatonia and unresponsive on my exam, although this waxes and vanes and changes based on the examiner  - Agree w/ Ativan trial at this time as previously showed intermittent improvement after administration; up-titration per psychiatry   - Based on the examination and history likely etiology is psychiatric rather that neurological  - Would appreciate OSH imaging if available for review rather than reports only  - EEG not recommended at this time; can consider if patient is unresponsive to psychiatric interventions that have previously worked.    
INR 1.4  May be nutritional vitamin K deficiency given long NPO status  Vitamin K SQ x1    
(M6) obeys commands